# Patient Record
Sex: MALE | Race: WHITE | NOT HISPANIC OR LATINO | Employment: UNEMPLOYED | URBAN - METROPOLITAN AREA
[De-identification: names, ages, dates, MRNs, and addresses within clinical notes are randomized per-mention and may not be internally consistent; named-entity substitution may affect disease eponyms.]

---

## 2019-04-28 ENCOUNTER — HOSPITAL ENCOUNTER (EMERGENCY)
Facility: MEDICAL CENTER | Age: 19
End: 2019-04-28
Payer: COMMERCIAL

## 2019-04-28 VITALS
RESPIRATION RATE: 15 BRPM | OXYGEN SATURATION: 98 % | HEIGHT: 68 IN | SYSTOLIC BLOOD PRESSURE: 103 MMHG | DIASTOLIC BLOOD PRESSURE: 72 MMHG | HEART RATE: 95 BPM | WEIGHT: 136.69 LBS | BODY MASS INDEX: 20.72 KG/M2 | TEMPERATURE: 97.2 F

## 2019-04-28 PROCEDURE — 302449 STATCHG TRIAGE ONLY (STATISTIC)

## 2019-04-29 NOTE — ED TRIAGE NOTES
Chief Complaint   Patient presents with   • Abdominal Pain     x2 days   • Vomiting     Ambulatory to triage for above. VSS. Explained triage process, to waiting room. Asked to inform RN if questions or concerns arise.

## 2019-04-29 NOTE — ED NOTES
Pt called for 2nd time from senior nancy and main lobby. No response. Discharging from system at this time.

## 2019-11-13 ENCOUNTER — HOSPITAL ENCOUNTER (EMERGENCY)
Facility: MEDICAL CENTER | Age: 19
End: 2019-11-13
Attending: EMERGENCY MEDICINE
Payer: MEDICAID

## 2019-11-13 VITALS
WEIGHT: 145.94 LBS | HEART RATE: 84 BPM | OXYGEN SATURATION: 99 % | TEMPERATURE: 97.7 F | RESPIRATION RATE: 18 BRPM | HEIGHT: 67 IN | DIASTOLIC BLOOD PRESSURE: 68 MMHG | BODY MASS INDEX: 22.91 KG/M2 | SYSTOLIC BLOOD PRESSURE: 100 MMHG

## 2019-11-13 DIAGNOSIS — K04.7 DENTAL INFECTION: ICD-10-CM

## 2019-11-13 PROCEDURE — A9270 NON-COVERED ITEM OR SERVICE: HCPCS | Performed by: EMERGENCY MEDICINE

## 2019-11-13 PROCEDURE — 700102 HCHG RX REV CODE 250 W/ 637 OVERRIDE(OP): Performed by: EMERGENCY MEDICINE

## 2019-11-13 PROCEDURE — 99283 EMERGENCY DEPT VISIT LOW MDM: CPT

## 2019-11-13 RX ORDER — AMOXICILLIN 500 MG/1
1000 CAPSULE ORAL ONCE
Status: COMPLETED | OUTPATIENT
Start: 2019-11-13 | End: 2019-11-13

## 2019-11-13 RX ORDER — ACETAMINOPHEN 325 MG/1
650 TABLET ORAL ONCE
Status: COMPLETED | OUTPATIENT
Start: 2019-11-13 | End: 2019-11-13

## 2019-11-13 RX ORDER — IBUPROFEN 200 MG
200 TABLET ORAL EVERY 6 HOURS PRN
Qty: 30 TAB | Refills: 0 | Status: SHIPPED | OUTPATIENT
Start: 2019-11-13 | End: 2019-12-31

## 2019-11-13 RX ORDER — AMOXICILLIN 500 MG/1
500 CAPSULE ORAL 3 TIMES DAILY
Qty: 30 CAP | Refills: 0 | Status: SHIPPED | OUTPATIENT
Start: 2019-11-13 | End: 2019-11-23

## 2019-11-13 RX ADMIN — AMOXICILLIN 1000 MG: 500 CAPSULE ORAL at 09:08

## 2019-11-13 RX ADMIN — ACETAMINOPHEN 650 MG: 325 TABLET, FILM COATED ORAL at 09:08

## 2019-11-13 NOTE — ED PROVIDER NOTES
"ED Provider Note      CHIEF COMPLAINT  Chief Complaint   Patient presents with   • Dental Pain       HPI  Christian Lima Jr. is a 18 y.o. male who presents with dental pain. Pain started a while ago. Pain is located right maxilla. Pain is severe. Constant. It is getting worse. No fevers. No neck stiffness or headache. Swelling is starting to develop.    REVIEW OF SYSTEMS      See HPI    PAST MEDICAL HISTORY  Past Medical History:   Diagnosis Date   • Sciatica        SOCIAL HISTORY  Social History     Tobacco Use   • Smoking status: Current Every Day Smoker     Packs/day: 0.50   • Smokeless tobacco: Never Used   Substance Use Topics   • Alcohol use: No   • Drug use: Yes     Types: Inhaled     Comment: marijuana 2/week       ALLERGIES  No Known Allergies    PHYSICAL EXAM  VITAL SIGNS: /68   Pulse 84   Temp 36.5 °C (97.7 °F) (Oral)   Resp 18   Ht 1.702 m (5' 7\")   Wt 66.2 kg (145 lb 15.1 oz)   SpO2 99%   BMI 22.86 kg/m²   Constitutional: Well developed, Well nourished, No acute distress, Non-toxic appearance.   HENT:  Atraumatic, Normocephalic. Bilateral external ears normal, Oropharynx with widespread dental decay.  There is gingival inflammation and mild swelling externally on the gingiva overlying the right maxillary molar and premolar region.  No abscess.  No facial cellulitis  Eyes: Grossly Normal inspection  Neck: Normal range of motion  Cardiovascular: Normal heart rate  Thorax & Lungs: No respiratory distress  Skin: No rash.     COURSE & MEDICAL DECISION MAKING  Patient with dental caries. No sign of abscess. We'll treat with amoxicillin and ibuprofen. I have advised followup with dentist as soon as possible. Asked to return to the emergency department for swelling, high fevers, or concern.      FINAL IMPRESSION  1. dental caries with secondary infection      This dictation was created using voice recognition software. The accuracy of the dictation is limited to the abilities of the " software.   The nursing notes were reviewed and certain aspects of this information were incorporated into this note.      Electronically signed by: Balaji Arenas, 11/13/2019 8:58 AM

## 2019-11-13 NOTE — ED NOTES
Discharge, FU, and Rx education provided to patient who verbalizes understanding   Patient denies further questions at this time   Pt was given prescriptions and Dental Referral Sheet   Wheelchair was offered to the waiting room and refused by patient  Patient ambulated to the ED Lobby with steady gait

## 2019-11-13 NOTE — ED NOTES
Assist RN  Ambulates to room w/steady gait. C/o pain in right lower tooth x 2 weeks states its d/t abscess. Denies any trauma.  Call light within reach. Instructed to call staff for any assistance.

## 2019-11-27 ENCOUNTER — HOSPITAL ENCOUNTER (EMERGENCY)
Facility: MEDICAL CENTER | Age: 19
End: 2019-11-27
Attending: EMERGENCY MEDICINE
Payer: COMMERCIAL

## 2019-11-27 VITALS
SYSTOLIC BLOOD PRESSURE: 120 MMHG | HEIGHT: 67 IN | RESPIRATION RATE: 16 BRPM | DIASTOLIC BLOOD PRESSURE: 90 MMHG | HEART RATE: 97 BPM | OXYGEN SATURATION: 100 % | BODY MASS INDEX: 23.25 KG/M2 | WEIGHT: 148.15 LBS | TEMPERATURE: 97.8 F

## 2019-11-27 DIAGNOSIS — J10.1 INFLUENZA B: ICD-10-CM

## 2019-11-27 DIAGNOSIS — H81.10 BENIGN PAROXYSMAL POSITIONAL VERTIGO, UNSPECIFIED LATERALITY: ICD-10-CM

## 2019-11-27 LAB
ANION GAP SERPL CALC-SCNC: 9 MMOL/L (ref 0–11.9)
BASOPHILS # BLD AUTO: 0.6 % (ref 0–1.8)
BASOPHILS # BLD: 0.03 K/UL (ref 0–0.12)
BUN SERPL-MCNC: 12 MG/DL (ref 8–22)
CALCIUM SERPL-MCNC: 9 MG/DL (ref 8.5–10.5)
CHLORIDE SERPL-SCNC: 104 MMOL/L (ref 96–112)
CO2 SERPL-SCNC: 26 MMOL/L (ref 20–33)
CREAT SERPL-MCNC: 0.88 MG/DL (ref 0.5–1.4)
EKG IMPRESSION: NORMAL
EOSINOPHIL # BLD AUTO: 0.02 K/UL (ref 0–0.51)
EOSINOPHIL NFR BLD: 0.4 % (ref 0–6.9)
ERYTHROCYTE [DISTWIDTH] IN BLOOD BY AUTOMATED COUNT: 43.1 FL (ref 35.9–50)
FLUAV RNA SPEC QL NAA+PROBE: NEGATIVE
FLUBV RNA SPEC QL NAA+PROBE: POSITIVE
GLUCOSE SERPL-MCNC: 89 MG/DL (ref 65–99)
HCT VFR BLD AUTO: 44.1 % (ref 42–52)
HGB BLD-MCNC: 14.4 G/DL (ref 14–18)
IMM GRANULOCYTES # BLD AUTO: 0.06 K/UL (ref 0–0.11)
IMM GRANULOCYTES NFR BLD AUTO: 1.3 % (ref 0–0.9)
LYMPHOCYTES # BLD AUTO: 0.99 K/UL (ref 1–4.8)
LYMPHOCYTES NFR BLD: 21 % (ref 22–41)
MCH RBC QN AUTO: 30 PG (ref 27–33)
MCHC RBC AUTO-ENTMCNC: 32.7 G/DL (ref 33.7–35.3)
MCV RBC AUTO: 91.9 FL (ref 81.4–97.8)
MONOCYTES # BLD AUTO: 0.79 K/UL (ref 0–0.85)
MONOCYTES NFR BLD AUTO: 16.7 % (ref 0–13.4)
NEUTROPHILS # BLD AUTO: 2.83 K/UL (ref 1.82–7.42)
NEUTROPHILS NFR BLD: 60 % (ref 44–72)
NRBC # BLD AUTO: 0 K/UL
NRBC BLD-RTO: 0 /100 WBC
PLATELET # BLD AUTO: 205 K/UL (ref 164–446)
PMV BLD AUTO: 9.8 FL (ref 9–12.9)
POTASSIUM SERPL-SCNC: 3.7 MMOL/L (ref 3.6–5.5)
RBC # BLD AUTO: 4.8 M/UL (ref 4.7–6.1)
SODIUM SERPL-SCNC: 139 MMOL/L (ref 135–145)
WBC # BLD AUTO: 4.7 K/UL (ref 4.8–10.8)

## 2019-11-27 PROCEDURE — 700111 HCHG RX REV CODE 636 W/ 250 OVERRIDE (IP): Performed by: EMERGENCY MEDICINE

## 2019-11-27 PROCEDURE — 93005 ELECTROCARDIOGRAM TRACING: CPT | Performed by: EMERGENCY MEDICINE

## 2019-11-27 PROCEDURE — 700102 HCHG RX REV CODE 250 W/ 637 OVERRIDE(OP): Performed by: EMERGENCY MEDICINE

## 2019-11-27 PROCEDURE — 87502 INFLUENZA DNA AMP PROBE: CPT

## 2019-11-27 PROCEDURE — 700105 HCHG RX REV CODE 258: Performed by: EMERGENCY MEDICINE

## 2019-11-27 PROCEDURE — A9270 NON-COVERED ITEM OR SERVICE: HCPCS | Performed by: EMERGENCY MEDICINE

## 2019-11-27 PROCEDURE — 85025 COMPLETE CBC W/AUTO DIFF WBC: CPT

## 2019-11-27 PROCEDURE — 80048 BASIC METABOLIC PNL TOTAL CA: CPT

## 2019-11-27 PROCEDURE — 99284 EMERGENCY DEPT VISIT MOD MDM: CPT

## 2019-11-27 PROCEDURE — 96374 THER/PROPH/DIAG INJ IV PUSH: CPT

## 2019-11-27 RX ORDER — MECLIZINE HYDROCHLORIDE 25 MG/1
25 TABLET ORAL EVERY 6 HOURS PRN
Qty: 10 TAB | Refills: 0 | Status: SHIPPED | OUTPATIENT
Start: 2019-11-27 | End: 2019-12-31

## 2019-11-27 RX ORDER — OSELTAMIVIR PHOSPHATE 75 MG/1
75 CAPSULE ORAL 2 TIMES DAILY
Qty: 10 CAP | Refills: 0 | Status: SHIPPED | OUTPATIENT
Start: 2019-11-27 | End: 2019-12-02

## 2019-11-27 RX ORDER — IBUPROFEN 600 MG/1
600 TABLET ORAL ONCE
Status: COMPLETED | OUTPATIENT
Start: 2019-11-27 | End: 2019-11-27

## 2019-11-27 RX ORDER — SODIUM CHLORIDE 9 MG/ML
1000 INJECTION, SOLUTION INTRAVENOUS ONCE
Status: COMPLETED | OUTPATIENT
Start: 2019-11-27 | End: 2019-11-27

## 2019-11-27 RX ORDER — MECLIZINE HYDROCHLORIDE 25 MG/1
25 TABLET ORAL ONCE
Status: COMPLETED | OUTPATIENT
Start: 2019-11-27 | End: 2019-11-27

## 2019-11-27 RX ORDER — IBUPROFEN 600 MG/1
600 TABLET ORAL EVERY 6 HOURS PRN
Qty: 30 TAB | Refills: 0 | Status: SHIPPED | OUTPATIENT
Start: 2019-11-27 | End: 2019-12-28

## 2019-11-27 RX ORDER — ONDANSETRON 2 MG/ML
4 INJECTION INTRAMUSCULAR; INTRAVENOUS ONCE
Status: COMPLETED | OUTPATIENT
Start: 2019-11-27 | End: 2019-11-27

## 2019-11-27 RX ADMIN — ONDANSETRON 4 MG: 2 INJECTION INTRAMUSCULAR; INTRAVENOUS at 14:17

## 2019-11-27 RX ADMIN — MECLIZINE HYDROCHLORIDE 25 MG: 25 TABLET ORAL at 14:17

## 2019-11-27 RX ADMIN — SODIUM CHLORIDE 1000 ML: 9 INJECTION, SOLUTION INTRAVENOUS at 14:17

## 2019-11-27 RX ADMIN — IBUPROFEN 600 MG: 600 TABLET ORAL at 15:53

## 2019-11-27 ASSESSMENT — ENCOUNTER SYMPTOMS
DIZZINESS: 1
EYE REDNESS: 0
NECK PAIN: 0
FEVER: 1
ABDOMINAL PAIN: 0
VOMITING: 0
BACK PAIN: 0
SHORTNESS OF BREATH: 0
NAUSEA: 1
COUGH: 1
FOCAL WEAKNESS: 0
SEIZURES: 0
BLURRED VISION: 0
DIARRHEA: 1
CHILLS: 1
SORE THROAT: 1
MYALGIAS: 1
HEADACHES: 0

## 2019-11-27 ASSESSMENT — LIFESTYLE VARIABLES: DO YOU DRINK ALCOHOL: NO

## 2019-11-27 NOTE — ED TRIAGE NOTES
Chief Complaint   Patient presents with   • Vertigo     Pt states symptoms since this am after waking. reports going to bed feeling ok.    • Body Aches   • Fever   • Chills       Explained to pt triage process, made pt aware to tell this RN/staff of any changes/concerns, pt verbalized understanding of process and instructions given. Pt to ER amanda.

## 2019-11-27 NOTE — ED PROVIDER NOTES
ED Provider Note    CHIEF COMPLAINT  Chief Complaint   Patient presents with   • Vertigo     Pt states symptoms since this am after waking. reports going to bed feeling ok.    • Body Aches   • Fever   • Chills       HPI  Christian Lima Jr. is a 18 y.o. otherwise healthy male who presents with flulike symptoms and dizziness onset this morning.  Patient states he was feeling well prior to going to bed yesterday evening.  He awoke this morning and turned over in bed with acute onset of dizziness described as vertigo.  In addition to this, he endorses subjective fevers and chills, nasal congestion, sore throat, cough, bilateral ear pain.  He has endorse nausea without vomiting and some diarrhea.  He does not know of any sick contacts.  He did not receive a flu vaccination this year.  He has had no recent head trauma.  Denies significant headache.    REVIEW OF SYSTEMS  See HPI for further details.   Review of Systems   Constitutional: Positive for chills and fever.   HENT: Positive for congestion, ear pain and sore throat.    Eyes: Negative for blurred vision and redness.   Respiratory: Positive for cough. Negative for shortness of breath.    Cardiovascular: Negative for chest pain and leg swelling.   Gastrointestinal: Positive for diarrhea and nausea. Negative for abdominal pain and vomiting.   Genitourinary: Negative for dysuria and urgency.   Musculoskeletal: Positive for myalgias. Negative for back pain and neck pain.   Skin: Negative for rash.   Neurological: Positive for dizziness. Negative for focal weakness, seizures and headaches.   Psychiatric/Behavioral: Negative for suicidal ideas.         PAST MEDICAL HISTORY   has a past medical history of Sciatica.    SOCIAL HISTORY  Social History     Tobacco Use   • Smoking status: Current Every Day Smoker     Packs/day: 0.50   • Smokeless tobacco: Never Used   Substance and Sexual Activity   • Alcohol use: No   • Drug use: Yes     Types: Inhaled     Comment:  "marijuana 2/week   • Sexual activity: Not on file       SURGICAL HISTORY  patient denies any surgical history    CURRENT MEDICATIONS  Home Medications    **Home medications have not yet been reviewed for this encounter**         ALLERGIES  No Known Allergies    PHYSICAL EXAM   VITAL SIGNS: /90   Pulse 97   Temp 36.6 °C (97.8 °F) (Temporal)   Resp 16   Ht 1.702 m (5' 7\")   Wt 67.2 kg (148 lb 2.4 oz)   SpO2 100%   BMI 23.20 kg/m²      Physical Exam   Constitutional: He is oriented to person, place, and time and well-developed, well-nourished, and in no distress. No distress.   Nontoxic-appearing young male.   HENT:   Head: Normocephalic and atraumatic.   Mouth/Throat: No oropharyngeal exudate.   Mild posterior oropharynx erythema.  No significant tonsillar swelling or exudates.  Bilateral TM's are normal.   Eyes: Pupils are equal, round, and reactive to light. Conjunctivae are normal.   Neck: Normal range of motion. Neck supple.   Cardiovascular: Normal rate, regular rhythm and normal heart sounds.   Pulmonary/Chest: Effort normal and breath sounds normal. No respiratory distress.   Abdominal: Soft. He exhibits no distension. There is no tenderness.   Musculoskeletal: Normal range of motion.         General: No tenderness or edema.   Neurological: He is alert and oriented to person, place, and time.   Alert and oriented x3.  Cranial nerves II-XII intact.  Strength 5/5 and sensation inact throughout all 4 extremities.  No pronator drift.  No dysmetria.  Normal speech. Normal gait.   Skin: Skin is warm and dry.   Psychiatric: Affect normal.         DIAGNOSTIC STUDIES    EKG  Results for orders placed or performed during the hospital encounter of 11/27/19   EKG (Now)   Result Value Ref Range    Report       [facility] Emergency Dept[facility]    Test Date:  2019-11-27  Pt Name:    JONNA PEREA             Department: ER  MRN:        [id number]                      Room:        57  Gender:     " "Male                         Technician: [id number]  :        2000                   Requested By:ALBA ALCALA  Order #:    [id number]                    Reading MD: Alba Alcala MD    Measurements  Intervals                                Axis  Rate:       96                           P:          65  NY:         140                          QRS:        58  QRSD:       88                           T:          19  QT:         312  QTc:        395    Interpretive Statements  SINUS RHYTHM  Normal axis and intervals  No ectopy or hypertrophy  No ST or T wave change  No previous ECG available for comparison  Electronically Signed On 2019 15:32:59 PST by Alba Alcala MD             LABS  Personally reviewed by me  Labs Reviewed   CBC WITH DIFFERENTIAL - Abnormal; Notable for the following components:       Result Value    WBC 4.7 (*)     MCHC 32.7 (*)     Lymphocytes 21.00 (*)     Monocytes 16.70 (*)     Immature Granulocytes 1.30 (*)     Lymphs (Absolute) 0.99 (*)     All other components within normal limits   INFLUENZA A/B BY PCR - Abnormal; Notable for the following components:    Influenza virus B, PCR POSITIVE (*)     All other components within normal limits   BASIC METABOLIC PANEL   ESTIMATED GFR           RADIOLOGY  Personally reviewed by me  No orders to display       ED COURSE  Vitals:    19 1233 19 1506   BP: 120/96 120/90   Pulse: (!) 109 97   Resp: 18 16   Temp: 36.6 °C (97.9 °F) 36.6 °C (97.8 °F)   TempSrc: Temporal Temporal   SpO2: 98% 100%   Weight: 67.2 kg (148 lb 2.4 oz)    Height: 1.702 m (5' 7\")          Medications administered:  Medications   NS infusion 1,000 mL (0 mL Intravenous Stopped 19 1520)   ondansetron (ZOFRAN) syringe/vial injection 4 mg (4 mg Intravenous Given 19 1417)   meclizine (ANTIVERT) tablet 25 mg (25 mg Oral Given 19 1417)   ibuprofen (MOTRIN) tablet 600 mg (600 mg Oral Given 19 1553)     Patient was given IV fluids for " tachycardia and possible.  IV hydration was used because oral hydration was not adequate alone.  Following fluid administration patient's symptoms and vitals were improved.      MEDICAL DECISION MAKING  Young otherwise healthy patient presents with flu like symptoms and vertigo onset this morning.  He is nontoxic appearing with mild tachycardia however otherwise normal vitals.  No focal neurologic deficits or concern for central process to explain vertigo such as intracranial mass or posterior stroke.  History consistent with BPPV.  History and exam not concerning for meningitis, strep pharyngitis, acute otitis media, pneumonia. Labs are reassuring without electrolyte abnormality or signs of sepsis.  Influenza test positive. Plan to treat empirically followed by reassessment.    Upon reassessment, patient is resting comfortably with normal vital signs.  No new complaints at this time.  Discussed results with patient and/or family as well as importance of primary care follow up.  Understands risks and benefits of Tamiflu and still requesting prescription. Patient understands plan of care and strict return precautions for new or changing symptoms.       IMPRESSION  (J10.1) Influenza B  (H81.10) Benign paroxysmal positional vertigo, unspecified laterality    Disposition: Discharge home, stable condition  Results, diagnoses, and treatment options were discussed with the patient and/or family. Patient verbalized understanding of plan of care.    Patient referred to primary care provider for monitoring and treatment of blood pressure.      Discharge Medication List as of 11/27/2019  3:49 PM      START taking these medications    Details   oseltamivir (TAMIFLU) 75 MG Cap Take 1 Cap by mouth 2 times a day for 5 days., Disp-10 Cap, R-0, Print Rx Paper      meclizine (ANTIVERT) 25 MG Tab Take 1 Tab by mouth every 6 hours as needed for Dizziness., Disp-10 Tab, R-0, Print Rx Paper      !! ibuprofen (MOTRIN) 600 MG Tab Take 1  Tab by mouth every 6 hours as needed., Disp-30 Tab, R-0, Print Rx Paper       !! - Potential duplicate medications found. Please discuss with provider.              Electronically signed by: Alba Rodriguez, 11/27/2019 1:30 PM

## 2019-12-28 ENCOUNTER — HOSPITAL ENCOUNTER (EMERGENCY)
Facility: MEDICAL CENTER | Age: 19
End: 2019-12-28
Attending: EMERGENCY MEDICINE
Payer: COMMERCIAL

## 2019-12-28 VITALS
SYSTOLIC BLOOD PRESSURE: 111 MMHG | HEART RATE: 91 BPM | DIASTOLIC BLOOD PRESSURE: 63 MMHG | TEMPERATURE: 98.1 F | RESPIRATION RATE: 16 BRPM | HEIGHT: 67 IN | OXYGEN SATURATION: 98 % | BODY MASS INDEX: 24.15 KG/M2 | WEIGHT: 153.88 LBS

## 2019-12-28 DIAGNOSIS — K04.7 DENTAL INFECTION: ICD-10-CM

## 2019-12-28 PROCEDURE — 700111 HCHG RX REV CODE 636 W/ 250 OVERRIDE (IP): Performed by: EMERGENCY MEDICINE

## 2019-12-28 PROCEDURE — 96375 TX/PRO/DX INJ NEW DRUG ADDON: CPT

## 2019-12-28 PROCEDURE — 700105 HCHG RX REV CODE 258: Performed by: EMERGENCY MEDICINE

## 2019-12-28 PROCEDURE — 99284 EMERGENCY DEPT VISIT MOD MDM: CPT

## 2019-12-28 PROCEDURE — 96365 THER/PROPH/DIAG IV INF INIT: CPT

## 2019-12-28 RX ORDER — KETOROLAC TROMETHAMINE 30 MG/ML
30 INJECTION, SOLUTION INTRAMUSCULAR; INTRAVENOUS ONCE
Status: COMPLETED | OUTPATIENT
Start: 2019-12-28 | End: 2019-12-28

## 2019-12-28 RX ORDER — IBUPROFEN 600 MG/1
600 TABLET ORAL EVERY 6 HOURS PRN
Qty: 30 TAB | Refills: 0 | Status: SHIPPED | OUTPATIENT
Start: 2019-12-28 | End: 2019-12-31

## 2019-12-28 RX ORDER — DEXAMETHASONE SODIUM PHOSPHATE 10 MG/ML
10 INJECTION, SOLUTION INTRAMUSCULAR; INTRAVENOUS ONCE
Status: COMPLETED | OUTPATIENT
Start: 2019-12-28 | End: 2019-12-28

## 2019-12-28 RX ORDER — AMOXICILLIN AND CLAVULANATE POTASSIUM 875; 125 MG/1; MG/1
1 TABLET, FILM COATED ORAL 2 TIMES DAILY
Qty: 20 TAB | Refills: 0 | Status: ON HOLD | OUTPATIENT
Start: 2019-12-28 | End: 2020-01-01 | Stop reason: SDUPTHER

## 2019-12-28 RX ADMIN — DEXAMETHASONE SODIUM PHOSPHATE 10 MG: 10 INJECTION INTRAMUSCULAR; INTRAVENOUS at 10:31

## 2019-12-28 RX ADMIN — KETOROLAC TROMETHAMINE 30 MG: 30 INJECTION, SOLUTION INTRAMUSCULAR at 10:31

## 2019-12-28 RX ADMIN — AMPICILLIN SODIUM AND SULBACTAM SODIUM 3 G: 2; 1 INJECTION, POWDER, FOR SOLUTION INTRAMUSCULAR; INTRAVENOUS at 10:31
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
## 2019-12-28 NOTE — ED PROVIDER NOTES
ED Provider Note    CHIEF COMPLAINT  Chief Complaint   Patient presents with   • Oral Swelling       HPI  Christian Lima Jr. is a 19 y.o. male who presents the chief complaint of oral swelling and pain started for 1 day on the right upper side.  Worse when he puts pressure on it.  No alleviating factors did not take any pain medicine.  No recent antibiotics for the last month.  No difficulty swelling.  It is constant.    Patient history of bad dentition and some poor dentition in the right upper molar area.  He was seen here a month ago for dental infection he states that usually he will need antibiotics about once a year last antibiotics were 1 month ago.    REVIEW OF SYSTEMS  General: No fever or chills.    Ear nose throat: The above no trouble swallowing  Pulmonary: No shortness of breath or cough.      PAST MEDICAL HISTORY  Past Medical History:   Diagnosis Date   • Sciatica        FAMILY HISTORY  History reviewed. No pertinent family history.    SOCIAL HISTORY  Social History     Socioeconomic History   • Marital status: Single     Spouse name: Not on file   • Number of children: Not on file   • Years of education: Not on file   • Highest education level: Not on file   Occupational History   • Not on file   Social Needs   • Financial resource strain: Not on file   • Food insecurity:     Worry: Not on file     Inability: Not on file   • Transportation needs:     Medical: Not on file     Non-medical: Not on file   Tobacco Use   • Smoking status: Current Every Day Smoker     Packs/day: 0.50   • Smokeless tobacco: Never Used   Substance and Sexual Activity   • Alcohol use: No   • Drug use: Yes     Types: Inhaled     Comment: marijuana 2/week   • Sexual activity: Not on file   Lifestyle   • Physical activity:     Days per week: Not on file     Minutes per session: Not on file   • Stress: Not on file   Relationships   • Social connections:     Talks on phone: Not on file     Gets together: Not on file      "Attends [employer] service: Not on file     Active member of club or organization: Not on file     Attends meetings of clubs or organizations: Not on file     Relationship status: Not on file   • Intimate partner violence:     Fear of current or ex partner: Not on file     Emotionally abused: Not on file     Physically abused: Not on file     Forced sexual activity: Not on file   Other Topics Concern   • Not on file   Social History Narrative   • Not on file       SURGICAL HISTORY  History reviewed. No pertinent surgical history.    CURRENT MEDICATIONS  Home Medications    **Home medications have not yet been reviewed for this encounter**          ALLERGIES  No Known Allergies    PHYSICAL EXAM  VITAL SIGNS: /66   Pulse 96   Temp 36.7 °C (98.1 °F) (Temporal)   Resp 16   Ht 1.702 m (5' 7\")   Wt 69.8 kg (153 lb 14.1 oz)   SpO2 100%   BMI 24.10 kg/m²   Constitutional: Well developed, Well nourished, No acute distress, Non-toxic appearance.   HENT: Is swelling on the right upper buccal mucosa.  There is no fluctuance no mass that is tender.  She is a minimal gingival tenderness as well there is necrotic teeth in the right upper molar and premolar area.  They are fractured.  Musculoskeletal: Neck has normal range of motion, No tenderness, Supple.   Lymphatic: No cervical lymphadenopathy noted.   Thorax & Lungs: Respiratory distress no muffled voice  Psychiatric: Calm, not anxious  Neurologic: Alert & oriented, moves all extremities equally    RADIOLOGY/PROCEDURES  He was established patient received Unasyn and Decadron Toradol    COURSE & MEDICAL DECISION MAKING  Pertinent Labs & Imaging studies reviewed. (See chart for details)  Early dental infection with some facial cellulitis about dose 1 dose as Unasyn with some Decadron will give some Toradol.  The patient's airway is intact he is talking full sentence looks well    Is a nontoxic patient reevaluation at 11 AM.  Overhead give the patient Augmentin to take " as directed for good penetration we recommend he take medication as directed for the next 7 days also recommend seeing dental will try to give a dental referral.  Patient is facial swelling no airway issue healthy no fever not tachycardic no indication for further testing at this time as he should respond to therapy however, he was warned there is always a possibility for not responding therapy and he needs return to the ER if not improved or if symptoms worsen.    FINAL IMPRESSION  1.  Dental caries  2.  Suspected facial cellulitis  3.      Electronically signed by: Meng Diaz, 12/28/2019 10:13 AM

## 2019-12-31 ENCOUNTER — HOSPITAL ENCOUNTER (INPATIENT)
Facility: MEDICAL CENTER | Age: 19
LOS: 1 days | DRG: 137 | End: 2020-01-01
Attending: EMERGENCY MEDICINE | Admitting: HOSPITALIST
Payer: COMMERCIAL

## 2019-12-31 ENCOUNTER — ANESTHESIA EVENT (OUTPATIENT)
Dept: SURGERY | Facility: MEDICAL CENTER | Age: 19
DRG: 137 | End: 2019-12-31
Payer: COMMERCIAL

## 2019-12-31 ENCOUNTER — APPOINTMENT (OUTPATIENT)
Dept: RADIOLOGY | Facility: MEDICAL CENTER | Age: 19
DRG: 137 | End: 2019-12-31
Attending: EMERGENCY MEDICINE
Payer: COMMERCIAL

## 2019-12-31 ENCOUNTER — ANESTHESIA (OUTPATIENT)
Dept: SURGERY | Facility: MEDICAL CENTER | Age: 19
DRG: 137 | End: 2019-12-31
Payer: COMMERCIAL

## 2019-12-31 DIAGNOSIS — K04.7 DENTAL ABSCESS: ICD-10-CM

## 2019-12-31 DIAGNOSIS — L03.211 FACIAL CELLULITIS: ICD-10-CM

## 2019-12-31 PROBLEM — Z72.0 TOBACCO USE: Status: ACTIVE | Noted: 2019-12-31

## 2019-12-31 LAB
ALBUMIN SERPL BCP-MCNC: 4.2 G/DL (ref 3.2–4.9)
ALBUMIN/GLOB SERPL: 1.4 G/DL
ALP SERPL-CCNC: 65 U/L (ref 30–99)
ALT SERPL-CCNC: 8 U/L (ref 2–50)
ANION GAP SERPL CALC-SCNC: 7 MMOL/L (ref 0–11.9)
AST SERPL-CCNC: 10 U/L (ref 12–45)
BASOPHILS # BLD AUTO: 0.5 % (ref 0–1.8)
BASOPHILS # BLD: 0.06 K/UL (ref 0–0.12)
BILIRUB SERPL-MCNC: 0.3 MG/DL (ref 0.1–1.5)
BUN SERPL-MCNC: 14 MG/DL (ref 8–22)
CALCIUM SERPL-MCNC: 9.1 MG/DL (ref 8.5–10.5)
CHLORIDE SERPL-SCNC: 110 MMOL/L (ref 96–112)
CO2 SERPL-SCNC: 24 MMOL/L (ref 20–33)
CREAT SERPL-MCNC: 0.92 MG/DL (ref 0.5–1.4)
EOSINOPHIL # BLD AUTO: 0.13 K/UL (ref 0–0.51)
EOSINOPHIL NFR BLD: 1 % (ref 0–6.9)
ERYTHROCYTE [DISTWIDTH] IN BLOOD BY AUTOMATED COUNT: 42.5 FL (ref 35.9–50)
GLOBULIN SER CALC-MCNC: 3.1 G/DL (ref 1.9–3.5)
GLUCOSE SERPL-MCNC: 76 MG/DL (ref 65–99)
HCT VFR BLD AUTO: 41.9 % (ref 42–52)
HGB BLD-MCNC: 13.8 G/DL (ref 14–18)
IMM GRANULOCYTES # BLD AUTO: 0.07 K/UL (ref 0–0.11)
IMM GRANULOCYTES NFR BLD AUTO: 0.6 % (ref 0–0.9)
LACTATE BLD-SCNC: 1.2 MMOL/L (ref 0.5–2)
LYMPHOCYTES # BLD AUTO: 2.33 K/UL (ref 1–4.8)
LYMPHOCYTES NFR BLD: 18.7 % (ref 22–41)
MCH RBC QN AUTO: 30.4 PG (ref 27–33)
MCHC RBC AUTO-ENTMCNC: 32.9 G/DL (ref 33.7–35.3)
MCV RBC AUTO: 92.3 FL (ref 81.4–97.8)
MONOCYTES # BLD AUTO: 1.53 K/UL (ref 0–0.85)
MONOCYTES NFR BLD AUTO: 12.2 % (ref 0–13.4)
NEUTROPHILS # BLD AUTO: 8.37 K/UL (ref 1.82–7.42)
NEUTROPHILS NFR BLD: 67 % (ref 44–72)
NRBC # BLD AUTO: 0 K/UL
NRBC BLD-RTO: 0 /100 WBC
PLATELET # BLD AUTO: 254 K/UL (ref 164–446)
PMV BLD AUTO: 10.1 FL (ref 9–12.9)
POTASSIUM SERPL-SCNC: 4.1 MMOL/L (ref 3.6–5.5)
PROT SERPL-MCNC: 7.3 G/DL (ref 6–8.2)
RBC # BLD AUTO: 4.54 M/UL (ref 4.7–6.1)
SODIUM SERPL-SCNC: 141 MMOL/L (ref 135–145)
WBC # BLD AUTO: 12.5 K/UL (ref 4.8–10.8)

## 2019-12-31 PROCEDURE — 160002 HCHG RECOVERY MINUTES (STAT): Performed by: DENTIST

## 2019-12-31 PROCEDURE — 700101 HCHG RX REV CODE 250: Performed by: DENTIST

## 2019-12-31 PROCEDURE — 160048 HCHG OR STATISTICAL LEVEL 1-5: Performed by: DENTIST

## 2019-12-31 PROCEDURE — 700117 HCHG RX CONTRAST REV CODE 255: Performed by: EMERGENCY MEDICINE

## 2019-12-31 PROCEDURE — 80053 COMPREHEN METABOLIC PANEL: CPT

## 2019-12-31 PROCEDURE — 700111 HCHG RX REV CODE 636 W/ 250 OVERRIDE (IP): Performed by: ANESTHESIOLOGY

## 2019-12-31 PROCEDURE — 700111 HCHG RX REV CODE 636 W/ 250 OVERRIDE (IP): Performed by: HOSPITALIST

## 2019-12-31 PROCEDURE — 700101 HCHG RX REV CODE 250: Performed by: ANESTHESIOLOGY

## 2019-12-31 PROCEDURE — 500440 HCHG DRESSING, STERILE ROLL (KERLIX): Performed by: DENTIST

## 2019-12-31 PROCEDURE — 700111 HCHG RX REV CODE 636 W/ 250 OVERRIDE (IP): Performed by: EMERGENCY MEDICINE

## 2019-12-31 PROCEDURE — 99406 BEHAV CHNG SMOKING 3-10 MIN: CPT | Performed by: HOSPITALIST

## 2019-12-31 PROCEDURE — 87102 FUNGUS ISOLATION CULTURE: CPT

## 2019-12-31 PROCEDURE — 160009 HCHG ANES TIME/MIN: Performed by: DENTIST

## 2019-12-31 PROCEDURE — 0CTW0Z1 RESECTION OF UPPER TOOTH, MULTIPLE, OPEN APPROACH: ICD-10-PCS | Performed by: DENTIST

## 2019-12-31 PROCEDURE — 96365 THER/PROPH/DIAG IV INF INIT: CPT

## 2019-12-31 PROCEDURE — 160027 HCHG SURGERY MINUTES - 1ST 30 MINS LEVEL 2: Performed by: DENTIST

## 2019-12-31 PROCEDURE — 501838 HCHG SUTURE GENERAL: Performed by: DENTIST

## 2019-12-31 PROCEDURE — 500754 HCHG JAW BRA: Performed by: DENTIST

## 2019-12-31 PROCEDURE — 700105 HCHG RX REV CODE 258: Performed by: HOSPITALIST

## 2019-12-31 PROCEDURE — 87015 SPECIMEN INFECT AGNT CONCNTJ: CPT

## 2019-12-31 PROCEDURE — 87116 MYCOBACTERIA CULTURE: CPT

## 2019-12-31 PROCEDURE — 87205 SMEAR GRAM STAIN: CPT

## 2019-12-31 PROCEDURE — 83605 ASSAY OF LACTIC ACID: CPT

## 2019-12-31 PROCEDURE — 99285 EMERGENCY DEPT VISIT HI MDM: CPT

## 2019-12-31 PROCEDURE — 87040 BLOOD CULTURE FOR BACTERIA: CPT

## 2019-12-31 PROCEDURE — 0C940ZZ DRAINAGE OF BUCCAL MUCOSA, OPEN APPROACH: ICD-10-PCS | Performed by: DENTIST

## 2019-12-31 PROCEDURE — 85025 COMPLETE CBC W/AUTO DIFF WBC: CPT

## 2019-12-31 PROCEDURE — 700111 HCHG RX REV CODE 636 W/ 250 OVERRIDE (IP)

## 2019-12-31 PROCEDURE — 87075 CULTR BACTERIA EXCEPT BLOOD: CPT

## 2019-12-31 PROCEDURE — 700105 HCHG RX REV CODE 258: Performed by: EMERGENCY MEDICINE

## 2019-12-31 PROCEDURE — 70487 CT MAXILLOFACIAL W/DYE: CPT

## 2019-12-31 PROCEDURE — 96375 TX/PRO/DX INJ NEW DRUG ADDON: CPT

## 2019-12-31 PROCEDURE — A9270 NON-COVERED ITEM OR SERVICE: HCPCS | Performed by: HOSPITALIST

## 2019-12-31 PROCEDURE — 700102 HCHG RX REV CODE 250 W/ 637 OVERRIDE(OP): Performed by: HOSPITALIST

## 2019-12-31 PROCEDURE — 160035 HCHG PACU - 1ST 60 MINS PHASE I: Performed by: DENTIST

## 2019-12-31 PROCEDURE — 87206 SMEAR FLUORESCENT/ACID STAI: CPT

## 2019-12-31 PROCEDURE — 770006 HCHG ROOM/CARE - MED/SURG/GYN SEMI*

## 2019-12-31 PROCEDURE — 70355 PANORAMIC X-RAY OF JAWS: CPT

## 2019-12-31 PROCEDURE — 87070 CULTURE OTHR SPECIMN AEROBIC: CPT

## 2019-12-31 PROCEDURE — 99223 1ST HOSP IP/OBS HIGH 75: CPT | Mod: 25 | Performed by: HOSPITALIST

## 2019-12-31 RX ORDER — MORPHINE SULFATE 4 MG/ML
4 INJECTION, SOLUTION INTRAMUSCULAR; INTRAVENOUS ONCE
Status: COMPLETED | OUTPATIENT
Start: 2019-12-31 | End: 2019-12-31

## 2019-12-31 RX ORDER — SODIUM CHLORIDE, SODIUM LACTATE, POTASSIUM CHLORIDE, CALCIUM CHLORIDE 600; 310; 30; 20 MG/100ML; MG/100ML; MG/100ML; MG/100ML
INJECTION, SOLUTION INTRAVENOUS CONTINUOUS
Status: DISCONTINUED | OUTPATIENT
Start: 2019-12-31 | End: 2020-01-01 | Stop reason: HOSPADM

## 2019-12-31 RX ORDER — LIDOCAINE HYDROCHLORIDE 40 MG/ML
SOLUTION TOPICAL PRN
Status: DISCONTINUED | OUTPATIENT
Start: 2019-12-31 | End: 2019-12-31 | Stop reason: SURG

## 2019-12-31 RX ORDER — AMOXICILLIN 250 MG
2 CAPSULE ORAL 2 TIMES DAILY
Status: DISCONTINUED | OUTPATIENT
Start: 2019-12-31 | End: 2020-01-01 | Stop reason: HOSPADM

## 2019-12-31 RX ORDER — ACETAMINOPHEN 325 MG/1
650 TABLET ORAL EVERY 6 HOURS PRN
Status: DISCONTINUED | OUTPATIENT
Start: 2019-12-31 | End: 2020-01-01 | Stop reason: HOSPADM

## 2019-12-31 RX ORDER — MEPERIDINE HYDROCHLORIDE 25 MG/ML
12.5 INJECTION INTRAMUSCULAR; INTRAVENOUS; SUBCUTANEOUS
Status: DISCONTINUED | OUTPATIENT
Start: 2019-12-31 | End: 2019-12-31 | Stop reason: HOSPADM

## 2019-12-31 RX ORDER — DEXAMETHASONE SODIUM PHOSPHATE 4 MG/ML
INJECTION, SOLUTION INTRA-ARTICULAR; INTRALESIONAL; INTRAMUSCULAR; INTRAVENOUS; SOFT TISSUE PRN
Status: DISCONTINUED | OUTPATIENT
Start: 2019-12-31 | End: 2019-12-31 | Stop reason: SURG

## 2019-12-31 RX ORDER — PROMETHAZINE HYDROCHLORIDE 25 MG/1
12.5-25 SUPPOSITORY RECTAL EVERY 4 HOURS PRN
Status: DISCONTINUED | OUTPATIENT
Start: 2019-12-31 | End: 2020-01-01 | Stop reason: HOSPADM

## 2019-12-31 RX ORDER — HALOPERIDOL 5 MG/ML
1 INJECTION INTRAMUSCULAR
Status: DISCONTINUED | OUTPATIENT
Start: 2019-12-31 | End: 2019-12-31 | Stop reason: HOSPADM

## 2019-12-31 RX ORDER — MIDAZOLAM HYDROCHLORIDE 1 MG/ML
1 INJECTION INTRAMUSCULAR; INTRAVENOUS
Status: DISCONTINUED | OUTPATIENT
Start: 2019-12-31 | End: 2019-12-31 | Stop reason: HOSPADM

## 2019-12-31 RX ORDER — BISACODYL 10 MG
10 SUPPOSITORY, RECTAL RECTAL
Status: DISCONTINUED | OUTPATIENT
Start: 2019-12-31 | End: 2020-01-01 | Stop reason: HOSPADM

## 2019-12-31 RX ORDER — ONDANSETRON 2 MG/ML
INJECTION INTRAMUSCULAR; INTRAVENOUS PRN
Status: DISCONTINUED | OUTPATIENT
Start: 2019-12-31 | End: 2019-12-31 | Stop reason: SURG

## 2019-12-31 RX ORDER — LIDOCAINE HYDROCHLORIDE 20 MG/ML
INJECTION, SOLUTION EPIDURAL; INFILTRATION; INTRACAUDAL; PERINEURAL PRN
Status: DISCONTINUED | OUTPATIENT
Start: 2019-12-31 | End: 2019-12-31 | Stop reason: SURG

## 2019-12-31 RX ORDER — PROMETHAZINE HYDROCHLORIDE 25 MG/1
12.5-25 TABLET ORAL EVERY 4 HOURS PRN
Status: DISCONTINUED | OUTPATIENT
Start: 2019-12-31 | End: 2020-01-01 | Stop reason: HOSPADM

## 2019-12-31 RX ORDER — NICOTINE 21 MG/24HR
21 PATCH, TRANSDERMAL 24 HOURS TRANSDERMAL
Status: DISCONTINUED | OUTPATIENT
Start: 2019-12-31 | End: 2020-01-01 | Stop reason: HOSPADM

## 2019-12-31 RX ORDER — MORPHINE SULFATE 4 MG/ML
1-4 INJECTION, SOLUTION INTRAMUSCULAR; INTRAVENOUS
Status: DISCONTINUED | OUTPATIENT
Start: 2019-12-31 | End: 2020-01-01 | Stop reason: HOSPADM

## 2019-12-31 RX ORDER — HYDROMORPHONE HYDROCHLORIDE 1 MG/ML
0.2 INJECTION, SOLUTION INTRAMUSCULAR; INTRAVENOUS; SUBCUTANEOUS
Status: DISCONTINUED | OUTPATIENT
Start: 2019-12-31 | End: 2019-12-31 | Stop reason: HOSPADM

## 2019-12-31 RX ORDER — HYDROMORPHONE HYDROCHLORIDE 1 MG/ML
0.4 INJECTION, SOLUTION INTRAMUSCULAR; INTRAVENOUS; SUBCUTANEOUS
Status: DISCONTINUED | OUTPATIENT
Start: 2019-12-31 | End: 2019-12-31 | Stop reason: HOSPADM

## 2019-12-31 RX ORDER — DIPHENHYDRAMINE HYDROCHLORIDE 50 MG/ML
12.5 INJECTION INTRAMUSCULAR; INTRAVENOUS
Status: DISCONTINUED | OUTPATIENT
Start: 2019-12-31 | End: 2019-12-31 | Stop reason: HOSPADM

## 2019-12-31 RX ORDER — OXYCODONE HYDROCHLORIDE 5 MG/1
5-10 TABLET ORAL
Status: DISCONTINUED | OUTPATIENT
Start: 2019-12-31 | End: 2020-01-01 | Stop reason: HOSPADM

## 2019-12-31 RX ORDER — OXYCODONE HCL 5 MG/5 ML
10 SOLUTION, ORAL ORAL
Status: DISCONTINUED | OUTPATIENT
Start: 2019-12-31 | End: 2019-12-31 | Stop reason: HOSPADM

## 2019-12-31 RX ORDER — ONDANSETRON 2 MG/ML
4 INJECTION INTRAMUSCULAR; INTRAVENOUS
Status: DISCONTINUED | OUTPATIENT
Start: 2019-12-31 | End: 2019-12-31 | Stop reason: HOSPADM

## 2019-12-31 RX ORDER — SODIUM CHLORIDE 9 MG/ML
INJECTION, SOLUTION INTRAVENOUS
Status: ACTIVE
Start: 2019-12-31 | End: 2020-01-01

## 2019-12-31 RX ORDER — PROCHLORPERAZINE EDISYLATE 5 MG/ML
5-10 INJECTION INTRAMUSCULAR; INTRAVENOUS EVERY 4 HOURS PRN
Status: DISCONTINUED | OUTPATIENT
Start: 2019-12-31 | End: 2020-01-01 | Stop reason: HOSPADM

## 2019-12-31 RX ORDER — POLYETHYLENE GLYCOL 3350 17 G/17G
1 POWDER, FOR SOLUTION ORAL
Status: DISCONTINUED | OUTPATIENT
Start: 2019-12-31 | End: 2020-01-01 | Stop reason: HOSPADM

## 2019-12-31 RX ORDER — HYDROMORPHONE HYDROCHLORIDE 1 MG/ML
1 INJECTION, SOLUTION INTRAMUSCULAR; INTRAVENOUS; SUBCUTANEOUS
Status: DISCONTINUED | OUTPATIENT
Start: 2019-12-31 | End: 2019-12-31 | Stop reason: HOSPADM

## 2019-12-31 RX ORDER — ONDANSETRON 4 MG/1
4 TABLET, ORALLY DISINTEGRATING ORAL EVERY 4 HOURS PRN
Status: DISCONTINUED | OUTPATIENT
Start: 2019-12-31 | End: 2020-01-01 | Stop reason: HOSPADM

## 2019-12-31 RX ORDER — CEFAZOLIN SODIUM 1 G/3ML
INJECTION, POWDER, FOR SOLUTION INTRAMUSCULAR; INTRAVENOUS PRN
Status: DISCONTINUED | OUTPATIENT
Start: 2019-12-31 | End: 2019-12-31 | Stop reason: SURG

## 2019-12-31 RX ORDER — IBUPROFEN 600 MG/1
600 TABLET ORAL EVERY 6 HOURS PRN
COMMUNITY

## 2019-12-31 RX ORDER — IPRATROPIUM BROMIDE AND ALBUTEROL SULFATE 2.5; .5 MG/3ML; MG/3ML
3 SOLUTION RESPIRATORY (INHALATION)
Status: DISCONTINUED | OUTPATIENT
Start: 2019-12-31 | End: 2019-12-31 | Stop reason: HOSPADM

## 2019-12-31 RX ORDER — SODIUM CHLORIDE, SODIUM GLUCONATE, SODIUM ACETATE, POTASSIUM CHLORIDE AND MAGNESIUM CHLORIDE 526; 502; 368; 37; 30 MG/100ML; MG/100ML; MG/100ML; MG/100ML; MG/100ML
500 INJECTION, SOLUTION INTRAVENOUS CONTINUOUS
Status: DISCONTINUED | OUTPATIENT
Start: 2019-12-31 | End: 2019-12-31 | Stop reason: HOSPADM

## 2019-12-31 RX ORDER — OXYCODONE HCL 5 MG/5 ML
5 SOLUTION, ORAL ORAL
Status: DISCONTINUED | OUTPATIENT
Start: 2019-12-31 | End: 2019-12-31 | Stop reason: HOSPADM

## 2019-12-31 RX ORDER — MIDAZOLAM HYDROCHLORIDE 1 MG/ML
INJECTION INTRAMUSCULAR; INTRAVENOUS PRN
Status: DISCONTINUED | OUTPATIENT
Start: 2019-12-31 | End: 2019-12-31 | Stop reason: SURG

## 2019-12-31 RX ORDER — KETOROLAC TROMETHAMINE 30 MG/ML
INJECTION, SOLUTION INTRAMUSCULAR; INTRAVENOUS PRN
Status: DISCONTINUED | OUTPATIENT
Start: 2019-12-31 | End: 2019-12-31 | Stop reason: SURG

## 2019-12-31 RX ORDER — ONDANSETRON 2 MG/ML
4 INJECTION INTRAMUSCULAR; INTRAVENOUS EVERY 4 HOURS PRN
Status: DISCONTINUED | OUTPATIENT
Start: 2019-12-31 | End: 2020-01-01 | Stop reason: HOSPADM

## 2019-12-31 RX ADMIN — MORPHINE SULFATE 4 MG: 4 INJECTION INTRAVENOUS at 14:55

## 2019-12-31 RX ADMIN — LIDOCAINE HYDROCHLORIDE 4 ML: 40 SOLUTION TOPICAL at 21:10

## 2019-12-31 RX ADMIN — AMPICILLIN SODIUM AND SULBACTAM SODIUM 3 G: 2; 1 INJECTION, POWDER, FOR SOLUTION INTRAMUSCULAR; INTRAVENOUS at 17:20

## 2019-12-31 RX ADMIN — ONDANSETRON 4 MG: 2 INJECTION INTRAMUSCULAR; INTRAVENOUS at 21:20

## 2019-12-31 RX ADMIN — OXYCODONE HYDROCHLORIDE 10 MG: 5 TABLET ORAL at 15:37

## 2019-12-31 RX ADMIN — FENTANYL CITRATE 50 MCG: 0.05 INJECTION, SOLUTION INTRAMUSCULAR; INTRAVENOUS at 21:50

## 2019-12-31 RX ADMIN — NICOTINE TRANSDERMAL SYSTEM 21 MG: 21 PATCH, EXTENDED RELEASE TRANSDERMAL at 14:54

## 2019-12-31 RX ADMIN — SODIUM CHLORIDE, POTASSIUM CHLORIDE, SODIUM LACTATE AND CALCIUM CHLORIDE: 600; 310; 30; 20 INJECTION, SOLUTION INTRAVENOUS at 17:20

## 2019-12-31 RX ADMIN — AMPICILLIN SODIUM AND SULBACTAM SODIUM 3 G: 2; 1 INJECTION, POWDER, FOR SOLUTION INTRAMUSCULAR; INTRAVENOUS at 23:01

## 2019-12-31 RX ADMIN — CEFAZOLIN 2 G: 330 INJECTION, POWDER, FOR SOLUTION INTRAMUSCULAR; INTRAVENOUS at 21:03

## 2019-12-31 RX ADMIN — ROCURONIUM BROMIDE 50 MG: 10 INJECTION, SOLUTION INTRAVENOUS at 21:09

## 2019-12-31 RX ADMIN — FENTANYL CITRATE 50 MCG: 50 INJECTION, SOLUTION INTRAMUSCULAR; INTRAVENOUS at 21:50

## 2019-12-31 RX ADMIN — IOHEXOL 80 ML: 350 INJECTION, SOLUTION INTRAVENOUS at 11:54

## 2019-12-31 RX ADMIN — MIDAZOLAM 2 MG: 1 INJECTION INTRAMUSCULAR; INTRAVENOUS at 21:05

## 2019-12-31 RX ADMIN — KETOROLAC TROMETHAMINE 30 MG: 30 INJECTION, SOLUTION INTRAMUSCULAR at 21:20

## 2019-12-31 RX ADMIN — MORPHINE SULFATE 2 MG: 4 INJECTION INTRAVENOUS at 22:55

## 2019-12-31 RX ADMIN — PROPOFOL 150 MG: 10 INJECTION, EMULSION INTRAVENOUS at 21:09

## 2019-12-31 RX ADMIN — SUGAMMADEX 200 MG: 100 INJECTION, SOLUTION INTRAVENOUS at 21:20

## 2019-12-31 RX ADMIN — AMPICILLIN SODIUM AND SULBACTAM SODIUM 3 G: 2; 1 INJECTION, POWDER, FOR SOLUTION INTRAMUSCULAR; INTRAVENOUS at 10:50

## 2019-12-31 RX ADMIN — MORPHINE SULFATE 4 MG: 4 INJECTION INTRAVENOUS at 10:49

## 2019-12-31 RX ADMIN — FENTANYL CITRATE 100 MCG: 50 INJECTION, SOLUTION INTRAMUSCULAR; INTRAVENOUS at 21:05

## 2019-12-31 RX ADMIN — LIDOCAINE HYDROCHLORIDE 50 MG: 20 INJECTION, SOLUTION EPIDURAL; INFILTRATION; INTRACAUDAL at 21:09

## 2019-12-31 RX ADMIN — FENTANYL CITRATE 50 MCG: 50 INJECTION, SOLUTION INTRAMUSCULAR; INTRAVENOUS at 22:01

## 2019-12-31 RX ADMIN — DEXAMETHASONE SODIUM PHOSPHATE 8 MG: 4 INJECTION, SOLUTION INTRA-ARTICULAR; INTRALESIONAL; INTRAMUSCULAR; INTRAVENOUS; SOFT TISSUE at 21:12

## 2019-12-31 ASSESSMENT — LIFESTYLE VARIABLES
EVER_SMOKED: YES
EVER HAD A DRINK FIRST THING IN THE MORNING TO STEADY YOUR NERVES TO GET RID OF A HANGOVER: NO
CONSUMPTION TOTAL: NEGATIVE
AVERAGE NUMBER OF DAYS PER WEEK YOU HAVE A DRINK CONTAINING ALCOHOL: 0
TOTAL SCORE: 0
ON A TYPICAL DAY WHEN YOU DRINK ALCOHOL HOW MANY DRINKS DO YOU HAVE: 0
HOW MANY TIMES IN THE PAST YEAR HAVE YOU HAD 5 OR MORE DRINKS IN A DAY: 0
DOES PATIENT WANT TO STOP DRINKING: NO
ALCOHOL_USE: NO
CONSUMPTION TOTAL: INCOMPLETE
DO YOU DRINK ALCOHOL: NO
TOTAL SCORE: 0
HAVE PEOPLE ANNOYED YOU BY CRITICIZING YOUR DRINKING: NO
EVER FELT BAD OR GUILTY ABOUT YOUR DRINKING: NO
EVER FELT BAD OR GUILTY ABOUT YOUR DRINKING: NO
DOES PATIENT WANT TO STOP DRINKING: NO
HAVE YOU EVER FELT YOU SHOULD CUT DOWN ON YOUR DRINKING: NO
TOTAL SCORE: 0
TOTAL SCORE: 0
EVER HAD A DRINK FIRST THING IN THE MORNING TO STEADY YOUR NERVES TO GET RID OF A HANGOVER: NO
HAVE PEOPLE ANNOYED YOU BY CRITICIZING YOUR DRINKING: NO
HAVE YOU EVER FELT YOU SHOULD CUT DOWN ON YOUR DRINKING: NO
TOTAL SCORE: 0
TOTAL SCORE: 0

## 2019-12-31 ASSESSMENT — ENCOUNTER SYMPTOMS
SHORTNESS OF BREATH: 0
CHILLS: 0
FEVER: 1

## 2019-12-31 ASSESSMENT — PATIENT HEALTH QUESTIONNAIRE - PHQ9
2. FEELING DOWN, DEPRESSED, IRRITABLE, OR HOPELESS: NOT AT ALL
SUM OF ALL RESPONSES TO PHQ9 QUESTIONS 1 AND 2: 0
1. LITTLE INTEREST OR PLEASURE IN DOING THINGS: NOT AT ALL

## 2019-12-31 ASSESSMENT — COGNITIVE AND FUNCTIONAL STATUS - GENERAL
SUGGESTED CMS G CODE MODIFIER DAILY ACTIVITY: CH
MOBILITY SCORE: 24
DAILY ACTIVITIY SCORE: 24
SUGGESTED CMS G CODE MODIFIER MOBILITY: CH

## 2019-12-31 ASSESSMENT — PAIN SCALES - GENERAL: PAIN_LEVEL: 0

## 2019-12-31 NOTE — ED NOTES
Med Rec complete per Pt at bedside  Allergies Reviewed    Pt started a 10 day course of AUGMENTIN on 12/28

## 2019-12-31 NOTE — H&P
Hospital Medicine History & Physical Note    Date of Service  12/31/2019    Primary Care Physician   None    Consultants  Dr. Hernandez, oral surgery    Code Status  Full     Chief Complaint  Right jaw swelling    History of Presenting Illness  19 y.o. male who presented 12/31/2019 with right upper tooth infection. Mr. Lima has no previously known medical conditions that developed pain and swelling of the right upper second back molar.  He presented to emergency room on 12/20/2019 was given dose of IV Unasyn and discharged home on oral Augmentin.  His right facial swelling worsened and now is being pressure on the right eye and he has increased pain therefore presented back to the emergency room where a CAT scan reveals a 2.2 cm subperiosteal abscess along the right maxilla and right facial cellulitis therefore he will be admitted for IV antibiotics and surgical incision and drainage by Dr. Hernandez.   In the emergency room, we had a discussion about smoking cessation and the vasoconstrictive aspects of nicotine.  Mr. Lima is interested in cessation and will accept a nicotine patch.  His girlfriend is at bedside and we also discussed that is very difficult for patients to stop smoking while there significant other smoke she nodded in agreement 3 minutes were spent in discussion of smoking cessation.    Review of Systems  Review of Systems   Constitutional: Positive for fever. Negative for chills.   HENT:        Right facial pain and swelling   Respiratory: Negative for shortness of breath.    Cardiovascular: Negative for chest pain.   All other systems reviewed and are negative.      Past Medical History  No medical problems    Surgical History  none     Family History  Strong family history of dental decay     Social History   reports that he has been smoking. He has been smoking about 0.50 packs per day. He has never used smokeless tobacco. He reports current drug use. Drug: Inhaled. He reports that he does not  drink alcohol.    Allergies  No Known Allergies    Medications  Prior to Admission Medications   Prescriptions Last Dose Informant Patient Reported? Taking?   amoxicillin-clavulanate (AUGMENTIN) 875-125 MG Tab 12/31/2019 at 0800 Patient No No   Sig: Take 1 Tab by mouth 2 times a day for 10 days.   ibuprofen (MOTRIN) 600 MG Tab 12/30/2019 at PM Patient Yes Yes   Sig: Take 600 mg by mouth every 6 hours as needed for Moderate Pain or Inflammation.      Facility-Administered Medications: None       Physical Exam  Temp:  [36.6 °C (97.9 °F)] 36.6 °C (97.9 °F)  Pulse:  [75-77] 77  Resp:  [18-20] 18  BP: (110-112)/(70-77) 112/77  SpO2:  [100 %] 100 %    Physical Exam  Vitals signs and nursing note reviewed.   Constitutional:       Appearance: Normal appearance.   HENT:      Head: Normocephalic and atraumatic.      Mouth/Throat:      Comments: Overall very poor dentition, his right second back molar in the 1 in front of that are black and necrotic  He has significant right facial swelling and redness consistent with facial cellulitis  Eyes:      General: No scleral icterus.     Conjunctiva/sclera: Conjunctivae normal.   Neck:      Musculoskeletal: Normal range of motion and neck supple.   Cardiovascular:      Rate and Rhythm: Normal rate and regular rhythm.      Heart sounds: No murmur.   Pulmonary:      Effort: Pulmonary effort is normal. No respiratory distress.      Breath sounds: No wheezing.   Abdominal:      General: There is no distension.      Tenderness: There is no tenderness.   Musculoskeletal:      Right lower leg: No edema.      Left lower leg: No edema.   Skin:     General: Skin is warm and dry.   Neurological:      General: No focal deficit present.      Mental Status: He is alert and oriented to person, place, and time.   Psychiatric:         Mood and Affect: Mood normal.         Behavior: Behavior normal.         Laboratory:  Recent Labs     12/31/19  1041   WBC 12.5*   RBC 4.54*   HEMOGLOBIN 13.8*    HEMATOCRIT 41.9*   MCV 92.3   MCH 30.4   MCHC 32.9*   RDW 42.5   PLATELETCT 254   MPV 10.1     Recent Labs     12/31/19  1041   SODIUM 141   POTASSIUM 4.1   CHLORIDE 110   CO2 24   GLUCOSE 76   BUN 14   CREATININE 0.92   CALCIUM 9.1     Recent Labs     12/31/19  1041   ALTSGPT 8   ASTSGOT 10*   ALKPHOSPHAT 65   TBILIRUBIN 0.3   GLUCOSE 76         No results for input(s): NTPROBNP in the last 72 hours.      No results for input(s): TROPONINT in the last 72 hours.    Urinalysis:    No results found     Imaging:  CT-MAXILLOFACIAL WITH PLUS RECONS   Final Result      1.  A 2.2 cm subperiosteal abscess along the right maxilla. Poor dentition.   2.  Right facial cellulitis.   3.  Reactive cervical lymphadenopathy.      [id number]   Final Result      No acute mandibular fracture.   Periodontal disease.   Possible abscess right mandibular first molar remnant.            Assessment/Plan:  I anticipate this patient will require at least two midnights for appropriate medical management, necessitating inpatient admission.    * Dental abscess- (present on admission)  Assessment & Plan  This is failed outpatient Augmentin therapy  CAT scan confirms an abscess  He will be kept n.p.o. and Dr. Hernandez, oral surgery, has been consulted from the emergency room will take him to the operating room tonight  He will be treated with IV Unasyn  Oral and IV pain medications have been ordered    Tobacco use- (present on admission)  Assessment & Plan  Cessation has been discussed  Nicotine patch offered      VTE prophylaxis: SCDs

## 2019-12-31 NOTE — ED PROVIDER NOTES
ED Provider Note    Scribed for Paulino Khan M.D. by Nessa Eubanks. 12/31/2019, 10:34 AM.    Primary care provider: None  Means of arrival: Walk in  History obtained from: Patient  History limited by: None    CHIEF COMPLAINT  •  Facial Swelling    HPI  Christian Lima Jr. is a 19 y.o. male who presents to the Emergency Department for facial swelling with an onset of three days. The patient developed an acute onset of oral pain and swelling to his right upper jaw three days ago secondary to poor dentition. He was evaluated in the emergency room on 12/28/19 for this complaint and treated with Unasyn and Decadron. He was discharged with a prescription for Augmentin. Since starting the antibiotic, the swelling has increased and now extends to his right eye, cheek and jaw. He has overlying erythema and pain to these areas and complains of trismus. No alleviating factors were reported. Negative for fevers, shortness of breath, dysphagia or vomiting. The patient is not established with a dentist.      REVIEW OF SYSTEMS  Pertinent positives include facial swelling with erythema, facial pain and trismus. Pertinent negatives include no fevers, shortness of breath, dysphagia or vomiting. As above, all other systems reviewed and are negative. See HPI for further details.       PAST MEDICAL HISTORY  Patient has a past medical history of Sciatica.      SURGICAL HISTORY  Patient denies any recent surgeries.      SOCIAL HISTORY  Social History     Tobacco Use   • Smoking status: Current Every Day Smoker     Packs/day: 0.50   • Smokeless tobacco: Never Used   Substance Use Topics   • Alcohol use: No   • Drug use: Yes     Types: Inhaled     Comment: marijuana 2/week      Social History     Substance and Sexual Activity   Drug Use Yes   • Types: Inhaled    Comment: marijuana 2/week       FAMILY HISTORY  History reviewed. No pertinent family history.      CURRENT MEDICATIONS  Home Medications     Reviewed by Rosa MONTIEL  "Hannah Mack (Pharmacy Tech) on 12/31/19 at 1103  Med List Status: Complete   Medication Last Dose Status   amoxicillin-clavulanate (AUGMENTIN) 875-125 MG Tab 12/31/2019 Active   ibuprofen (MOTRIN) 600 MG Tab 12/30/2019 Active                ALLERGIES  None      PHYSICAL EXAM  VITAL SIGNS: /70   Pulse 75   Temp 36.6 °C (97.9 °F) (Temporal)   Resp 20   Ht 1.702 m (5' 7\")   Wt 69.4 kg (153 lb)   SpO2 100%   BMI 23.96 kg/m²     Vitals reviewed.    Constitutional: Alert in no apparent distress.  HENT: No signs of trauma, Bilateral external ears normal, Nose normal. Dental caries noted, Facial swelling with no globe involvement.   Eyes: Pupils are equal and reactive, Conjunctiva normal, Non-icteric. Soft tissue swelling of right face with no globe involvement.   Neck: Normal range of motion, No tenderness, Supple, No stridor.   Lymphatic: No lymphadenopathy noted.   Cardiovascular: Regular rate and rhythm, no murmurs.   Thorax & Lungs: Normal breath sounds, No respiratory distress, No wheezing, No chest tenderness.   Abdomen: Bowel sounds normal, Soft, No tenderness, No peritoneal signs, No masses, No pulsatile masses.   Skin: Warm, Dry, No erythema, No rash.   Back: No bony tenderness, No CVA tenderness.   Extremities: Intact distal pulses, No edema, No tenderness, No cyanosis  Musculoskeletal: Good range of motion in all major joints. No tenderness to palpation or major deformities noted.   Neurologic: Alert , Normal motor function, Normal sensory function, No focal deficits noted.   Psychiatric: Affect normal, Judgment normal, Mood normal.       DIAGNOSTIC STUDIES / PROCEDURES    LABS  Labs Reviewed   CBC WITH DIFFERENTIAL - Abnormal; Notable for the following components:       Result Value    WBC 12.5 (*)     RBC 4.54 (*)     Hemoglobin 13.8 (*)     Hematocrit 41.9 (*)     MCHC 32.9 (*)     Lymphocytes 18.70 (*)     Neutrophils (Absolute) 8.37 (*)     Monos (Absolute) 1.53 (*)     All other components " "within normal limits   COMP METABOLIC PANEL - Abnormal; Notable for the following components:    AST(SGOT) 10 (*)     All other components within normal limits   LACTIC ACID   BLOOD CULTURE    Narrative:     1 of 2 for Blood Culture x 2 sites order. Per Hospital  Policy: Only change Specimen Src: to \"Line\" if specified by  physician order.   BLOOD CULTURE    Narrative:     2 of 2 blood culture x2  Sites order. Per Hospital Policy:  Only change Specimen Src: to \"Line\" if specified by physician  order.   ESTIMATED GFR      All labs reviewed by me.      RADIOLOGY  CT-MAXILLOFACIAL WITH PLUS RECONS   Final Result      1.  A 2.2 cm subperiosteal abscess along the right maxilla. Poor dentition.   2.  Right facial cellulitis.   3.  Reactive cervical lymphadenopathy.      [id number]   Final Result      No acute mandibular fracture.   Periodontal disease.   Possible abscess right mandibular first molar remnant.        The radiologist's interpretation of all radiological studies have been reviewed by me.      COURSE & MEDICAL DECISION MAKING  Pertinent Labs & Imaging studies reviewed. (See chart for details)    Differential Diagnoses include but are not limited to: abscess versus facial cellulitis.     10:34 AM Obtained and reviewed patient's electronic medical records which indicate an ED visit on 12/28/19 for oral swelling and treated with Unasyn and Decadron for suspected facial cellulitis. Discharged with Augmentin.    10:38 AM Patient seen and examined at bedside for facial swelling.      Initial orders in the Emergency Department included XR mandible, CT maxilla and laboratory testing: CBC with differential, CMP, estimated GFR, lactic acid and blood cultures.  Initial treatment in the Emergency Department included Unasyn 3 g IV and Morphine 4 mg IV. Patient received maintenance fluids as he is to remain NPO.    12:24 PM Paged Hospitalist.    12:28 PM On reevaluation, diagnostic results were reviewed with him. " Plan to speak with Oral Surgery. Patient ate last night and drank fluids at 9:30 AM today. He is to remain NPO.    12:36 PM Spoke with Dr. Hernandez, Oral Surgery, regarding the patient's presenting symptoms and diagnostic results. He will assess the patient for surgery and asked to the patient to be hospitalized.    12:56 PM Spoke with Dr. Roman, Hospitalist, regarding the patient's presenting symptoms and diagnostic results. They will assess the patient for hospitalization.          DISPOSITION  Patient will be hospitalized by Dr. Roman, Hospitalist, in stable condition.      DIAGNOSIS  1. Dental abscess    2. Facial cellulitis             Nessa PEREIRA (Mikayla), am scribing for, and in the presence of, Paulino Khan M.D.    Electronically signed by: Nessa Eubanks (Mikayla), 12/31/2019    Paulino PEREIRA M.D. personally performed the services described in this documentation, as scribed by Nessa Eubanks in my presence, and it is both accurate and complete. C.      The note accurately reflects work and decisions made by me.  Paulino Khan  12/31/2019  1:09 PM

## 2019-12-31 NOTE — ED TRIAGE NOTES
Chief Complaint   Patient presents with   • Facial Swelling     since Saturday     Pt ambulated to triage, here for right facial swelling from tooth right upper quad. Swelling spread to right eye ,cheek and jaw, with trismus. Pt denies sob or difficulty swallowing. He is currently taking Augmentin but no relief.

## 2020-01-01 VITALS
RESPIRATION RATE: 16 BRPM | BODY MASS INDEX: 24.01 KG/M2 | OXYGEN SATURATION: 97 % | HEIGHT: 67 IN | WEIGHT: 153 LBS | HEART RATE: 72 BPM | SYSTOLIC BLOOD PRESSURE: 96 MMHG | DIASTOLIC BLOOD PRESSURE: 55 MMHG | TEMPERATURE: 98.8 F

## 2020-01-01 PROBLEM — K04.7 DENTAL ABSCESS: Status: RESOLVED | Noted: 2019-12-31 | Resolved: 2020-01-01

## 2020-01-01 LAB
GRAM STN SPEC: ABNORMAL
RHODAMINE-AURAMINE STN SPEC: NORMAL
SIGNIFICANT IND 70042: ABNORMAL
SIGNIFICANT IND 70042: NORMAL
SITE SITE: ABNORMAL
SITE SITE: NORMAL
SOURCE SOURCE: ABNORMAL
SOURCE SOURCE: NORMAL

## 2020-01-01 PROCEDURE — 700111 HCHG RX REV CODE 636 W/ 250 OVERRIDE (IP): Performed by: HOSPITALIST

## 2020-01-01 PROCEDURE — 99239 HOSP IP/OBS DSCHRG MGMT >30: CPT | Performed by: INTERNAL MEDICINE

## 2020-01-01 PROCEDURE — 700105 HCHG RX REV CODE 258: Performed by: HOSPITALIST

## 2020-01-01 PROCEDURE — 700102 HCHG RX REV CODE 250 W/ 637 OVERRIDE(OP): Performed by: HOSPITALIST

## 2020-01-01 PROCEDURE — A9270 NON-COVERED ITEM OR SERVICE: HCPCS | Performed by: HOSPITALIST

## 2020-01-01 RX ORDER — ONDANSETRON 4 MG/1
4 TABLET, ORALLY DISINTEGRATING ORAL EVERY 4 HOURS PRN
Qty: 10 TAB | Refills: 0 | Status: SHIPPED | OUTPATIENT
Start: 2020-01-01

## 2020-01-01 RX ORDER — AMOXICILLIN 250 MG
2 CAPSULE ORAL 2 TIMES DAILY
Qty: 30 TAB | Refills: 0 | Status: SHIPPED | OUTPATIENT
Start: 2020-01-01

## 2020-01-01 RX ORDER — NICOTINE 14MG/24HR
1 PATCH, TRANSDERMAL 24 HOURS TRANSDERMAL 2 TIMES DAILY WITH MEALS
Qty: 20 CAP | Refills: 0 | Status: SHIPPED | OUTPATIENT
Start: 2020-01-01 | End: 2020-01-11

## 2020-01-01 RX ORDER — OXYCODONE HYDROCHLORIDE 5 MG/1
5 TABLET ORAL EVERY 6 HOURS PRN
Qty: 12 TAB | Refills: 0 | Status: SHIPPED | OUTPATIENT
Start: 2020-01-01 | End: 2020-01-04

## 2020-01-01 RX ORDER — ACETAMINOPHEN 325 MG/1
650 TABLET ORAL EVERY 6 HOURS PRN
Qty: 30 TAB | Refills: 0 | Status: SHIPPED | OUTPATIENT
Start: 2020-01-01

## 2020-01-01 RX ORDER — AMOXICILLIN AND CLAVULANATE POTASSIUM 875; 125 MG/1; MG/1
1 TABLET, FILM COATED ORAL 2 TIMES DAILY
Qty: 20 TAB | Refills: 0 | Status: SHIPPED | OUTPATIENT
Start: 2020-01-01 | End: 2020-01-11

## 2020-01-01 RX ADMIN — OXYCODONE HYDROCHLORIDE 10 MG: 5 TABLET ORAL at 11:20

## 2020-01-01 RX ADMIN — NICOTINE TRANSDERMAL SYSTEM 21 MG: 21 PATCH, EXTENDED RELEASE TRANSDERMAL at 04:48

## 2020-01-01 RX ADMIN — AMPICILLIN SODIUM AND SULBACTAM SODIUM 3 G: 2; 1 INJECTION, POWDER, FOR SOLUTION INTRAMUSCULAR; INTRAVENOUS at 11:20

## 2020-01-01 RX ADMIN — SODIUM CHLORIDE, POTASSIUM CHLORIDE, SODIUM LACTATE AND CALCIUM CHLORIDE: 600; 310; 30; 20 INJECTION, SOLUTION INTRAVENOUS at 06:47

## 2020-01-01 RX ADMIN — AMPICILLIN SODIUM AND SULBACTAM SODIUM 3 G: 2; 1 INJECTION, POWDER, FOR SOLUTION INTRAMUSCULAR; INTRAVENOUS at 04:47

## 2020-01-01 NOTE — PROGRESS NOTES
Bedside report received.  Assessment complete.  A&O x 4. Patient calls appropriately.  Patient up self and steady.   Patient has minimal pain, declines pain medication at this time.  Denies N&V. Tolerating diet.  Surgical incision to internal mouth to R jaw, teeth extraction, gauze changed as needed to area.  + void, + flatus, 12/31 BM.  Patient denies SOB.  Patient girlfriend at bedside.  Review plan with of care with patient. Call light and personal belongings with in reach. Hourly rounding in place. All needs met at this time.

## 2020-01-01 NOTE — ANESTHESIA PROCEDURE NOTES
Airway  Date/Time: 12/31/2019 9:10 PM  Performed by: Andrés Darden III, M.D.  Authorized by: Andrés Darden III, M.D.     Location:  OR  Urgency:  Elective  Difficult Airway: No    Indications for Airway Management:  Anesthesia  Spontaneous Ventilation: absent    Sedation Level:  Deep  Preoxygenated: Yes    Patient Position:  Sniffing  Final Airway Type:  Endotracheal airway  Final Endotracheal Airway:  ETT  Cuffed: Yes    Technique Used for Successful ETT Placement:  Direct laryngoscopy  Insertion Site:  Oral  Blade Type:  López  Laryngoscope Blade/Videolaryngoscope Blade Size:  3  ETT Size (mm):  7.0  Measured from:  Lips  ETT to Lips (cm):  22  Placement Verified by: auscultation and capnometry    Cormack-Lehane Classification:  Grade III - view of epiglottis only  Number of Attempts at Approach:  1

## 2020-01-01 NOTE — CARE PLAN
Problem: Pain Management  Goal: Pain level will decrease to patient's comfort goal  Outcome: PROGRESSING AS EXPECTED  Note:   Patient experiencing pain relief with MAR medication. Patient encouraged to call if pain worsens. Hourly rounding in place.    Problem: Safety  Goal: Will remain free from injury  Outcome: PROGRESSING AS EXPECTED  Note:   Patient free from accidental injury at this time. Safety precautions in place. Hourly rounding in place

## 2020-01-01 NOTE — DISCHARGE SUMMARY
Discharge Summary    CHIEF COMPLAINT ON ADMISSION  Chief Complaint   Patient presents with   • Facial Swelling     since Saturday       Reason for Admission  Facial Swelling; Dental Pain     Admission Date  12/31/2019    CODE STATUS  Full Code    HPI & HOSPITAL COURSE  This is a 19 y.o. male here with periodontal abscess and cellulitis.  Please see the dictated HPI 12/31/2019 for complete details.  Patient was taken to the operating room by OMFS and underwent incision and drainage of abscess as well as dental extraction of teeth #1, 2, 3, alveoloplasty of the aforementioned teeth 12/31/2019.  Patient received several doses of IV Unasyn during his stay as well as pain medication.  He was cleared from surgery so long as he was tolerating a diet and his pain, nausea and vomiting were controlled.    On postoperative day #1 the patient was tolerating at least 50% of his breakfast and 100% of his fluids.  He was asking for more fluids.  He requested a nicotine patch.  He was counseled extensively on the need to stop using tobacco products as these complicate wound healing and increases risk of infection.  At discharge he will be given a prescription for Augmentin, oral rinse, stool softeners, pain relievers and instructions to follow-up with his oral surgeon Dr. De Hernandez for routine follow-up.      Therefore, he is discharged in good and stable condition to home with close outpatient follow-up.    Discharge Date  1/1/2020    FOLLOW UP ITEMS POST DISCHARGE  Oral and maxillofacial surgery follow-up    DISCHARGE DIAGNOSES  Principal Problem (Resolved):    Dental abscess POA: Yes  Active Problems:    Tobacco use POA: Yes      FOLLOW UP  No future appointments.  No follow-up provider specified.    MEDICATIONS ON DISCHARGE     Medication List      START taking these medications      Instructions   acetaminophen 325 MG Tabs  Commonly known as:  TYLENOL   Take 2 Tabs by mouth every 6 hours as needed (Mild Pain; (Pain scale  1-3); Temp greater than 100.5 F).  Dose:  650 mg     magnesium hydroxide 400 MG/5ML Susp  Commonly known as:  MILK OF MAGNESIA   Take 30 mL by mouth 1 time daily as needed (if polyethylene glycol ineffective after 24 hours).  Dose:  30 mL     ondansetron 4 MG Tbdp  Commonly known as:  ZOFRAN ODT   Take 1 Tab by mouth every four hours as needed for Nausea (give PO if no IV route available).  Dose:  4 mg     oxyCODONE immediate-release 5 MG Tabs  Commonly known as:  ROXICODONE   Take 1 Tab by mouth every 6 hours as needed for up to 3 days.  Dose:  5 mg     Probiotic 250 MG Caps   Take 1 Cap by mouth 2 times a day, with meals for 10 days.  Dose:  1 Cap     senna-docusate 8.6-50 MG Tabs  Commonly known as:  PERICOLACE or SENOKOT S   Doctor's comments:  STOP if diarrhea  Take 2 Tabs by mouth 2 Times a Day.  Dose:  2 Tab        CONTINUE taking these medications      Instructions   amoxicillin-clavulanate 875-125 MG Tabs  Commonly known as:  AUGMENTIN   Take 1 Tab by mouth 2 times a day for 10 days.  Dose:  1 Tab     ibuprofen 600 MG Tabs  Commonly known as:  MOTRIN   Take 600 mg by mouth every 6 hours as needed for Moderate Pain or Inflammation.  Dose:  600 mg            Allergies  No Known Allergies    DIET  Orders Placed This Encounter   Procedures   • Diet Order Regular     Standing Status:   Standing     Number of Occurrences:   1     Order Specific Question:   Diet:     Answer:   Regular [1]     Order Specific Question:   Texture/Fiber modifications:     Answer:   Dysphagia 3(Mechanical Soft)specify fluid consistency(question 6) [3]     Comments:   dental soft       ACTIVITY  As tolerated.  Weight bearing as tolerated    CONSULTATIONS  OMFS    PROCEDURES  I&D and alveoloplasty teeth #1, 2, 3 following extraction of aforementioned teeth; Dr. De Hernandez    LABORATORY  Lab Results   Component Value Date    SODIUM 141 12/31/2019    POTASSIUM 4.1 12/31/2019    CHLORIDE 110 12/31/2019    CO2 24 12/31/2019    GLUCOSE 76  12/31/2019    BUN 14 12/31/2019    CREATININE 0.92 12/31/2019        Lab Results   Component Value Date    WBC 12.5 (H) 12/31/2019    HEMOGLOBIN 13.8 (L) 12/31/2019    HEMATOCRIT 41.9 (L) 12/31/2019    PLATELETCT 254 12/31/2019        Total time of the discharge process exceeds 40 minutes.

## 2020-01-01 NOTE — DISCHARGE INSTRUCTIONS
Discharge Instructions    Discharged to home by car with relative. Discharged via walking, hospital escort: Yes.  Special equipment needed: Not Applicable    Be sure to schedule a follow-up appointment with your primary care doctor or any specialists as instructed.     Discharge Plan:   Diet Plan: Discussed  Activity Level: Discussed  Smoking Cessation Offered: Patient Refused  Confirmed Follow up Appointment: Patient to Call and Schedule Appointment  Confirmed Symptoms Management: Discussed  Medication Reconciliation Updated: No (Comments)  Influenza Vaccine Indication: Patient Refuses    I understand that a diet low in cholesterol, fat, and sodium is recommended for good health. Unless I have been given specific instructions below for another diet, I accept this instruction as my diet prescription.   Other diet: Dental Soft    Special Instructions: None    · Is patient discharged on Warfarin / Coumadin?   No     Dental Extraction, Care After  These instructions give you information about caring for yourself after your procedure. Your doctor may also give you more specific instructions. Call your doctor if you have any problems or questions after your procedure.  HOME CARE  Lifestyle    · Protect the area where your tooth was removed. Do this even if you do not have any pain.  · Do not smoke, do not spit, and do not drink through a straw until your doctor says it is okay.  · Eat soft foods as told by your doctor. Avoid hot drinks and spicy foods until your gum has healed.  Cut (Incision) Care  · Follow instructions from your doctor about:  ¨ How to take care of the cut that was made in your gum.  ¨ When and how to change gauze.  ¨ When and how to take gauze out of your mouth.  ¨ Having your stitches (sutures) removed.  · Do not chew on the gauze.  · If your gum is bleeding a lot, fold a clean piece of gauze, place it on the bleeding gum, and bite on it as told by your doctor.  General Instructions   · Take  medicines only as told by your doctor.  · Do not rinse your mouth until your doctor says it is okay. Once you can rinse your mouth, it is important to rinse very gently.  ¨ You may rinse your mouth with warm salt water once your doctor says it is okay. You can make a salt rinse by mixing one teaspoon of salt in two cups of warm water. Do this as told by your doctor.  · Do not brush or floss near where your tooth was removed until your doctor says it is okay. You may brush your other teeth.    · If directed, apply ice to your cheek on the side of your mouth where your tooth was removed:  ¨ Put ice in a plastic bag.  ¨ Place a towel between your skin and the bag.  ¨ Leave the ice on for 20 minutes, 2-3 times a day.  · Keep all follow-up visits as told by your doctor. This is important.  GET HELP IF:  · Your medicine is not helping your pain.  · You have a fever and you also have any of these:  ¨ A sick feeling in your stomach (nausea).  ¨ Throwing up (vomiting).  ¨ Chills.  · You have a very bad cough.  · You are short of breath.  GET HELP RIGHT AWAY IF:  · You have a lot of bleeding that does not stop.  · You have more puffiness (swelling).  · You have very bad pain.  · You have fluid, blood, or pus coming from the gum where your tooth was removed.  · You have trouble swallowing.  · You cannot open your mouth.     This information is not intended to replace advice given to you by your health care provider. Make sure you discuss any questions you have with your health care provider.     Document Released: 06/07/2011 Document Revised: 05/03/2016 Document Reviewed: 12/14/2015  [employer] Interactive Patient Education ©2016 [employer]      Antibiotic Medicine  Introduction  Antibiotic medicines are used to treat infections caused by bacteria. They work by hurting or killing the germs that are making you sick.  How will my medicine be picked?  There are many kinds of antibiotic medicines. To help your doctor pick one,  tell your doctor if:  · You have any allergies.  · You are pregnant or plan to get pregnant.  · You are breastfeeding.  · You are taking any medicines. These include over-the-counter medicines, prescription medicines, and herbal remedies.  · You have a medical condition or problem.  If you have questions about why your medicine was picked, ask.  For how long should I take my medicine?  Take your medicine for as long as your doctor tells you to. Do not stop taking it when you feel better. If you stop taking it too soon:  · You may start to feel sick again.  · Your infection may get harder to treat.  · New problems may develop.  What if I miss a dose?  Try not to miss any doses of antibiotic medicine. If you miss a dose:  · Take the dose as soon as you can.  · If you are taking 2 doses a day, take the next dose in 5 to 6 hours.  · If you are taking 3 or more doses a day, take the next dose in 2 to 4 hours. Then go back to the normal schedule.  If you cannot take a missed dose, take the next dose on time. Then take the missed dose after you have taken all the doses as told by your doctor, as if you had one more dose left.  Does this medicine affect birth control?  Birth control pills may not work while you are on antibiotic medicines. If you are taking birth control pills, keep taking them as usual. Use a second form of birth control, such as a condom. Keep using the second form of birth control until you are finished with your current 1 month cycle of birth control pills.  Get help if:  · You get worse.  · You do not feel better a few days after starting the medicine.  · You throw up (vomit).  · There are white patches in your mouth.  · You have new joint pain after starting the medicine.  · You have new muscle aches after starting the medicine.  · You had a fever before starting the medicine, and it comes back.  · You have any symptoms of an allergic reaction, such as an itchy rash. If this happens, stop taking the  medicine.  Get help right away if:  · Your pee (urine) turns dark or becomes blood-colored.  · Your skin turns yellow.  · You bruise or bleed easily.  · You have very bad watery poop (diarrhea) and cramps in your belly (abdomen).  · You have a very bad headache.  · You have signs of a very bad allergic reaction, such as:  ¨ Trouble breathing.  ¨ Wheezing.  ¨ Swelling of the lips, tongue, or face.  ¨ Fainting.  ¨ Blisters on the skin or in the mouth.  If you have signs of a very bad allergic reaction, stop taking the antibiotic medicine right away.  This information is not intended to replace advice given to you by your health care provider. Make sure you discuss any questions you have with your health care provider.  Document Released: 09/26/2009 Document Revised: 08/15/2017 Document Reviewed: 05/04/2016  © 2017 [employer]    Depression / Suicide Risk    As you are discharged from this [facility] facility, it is important to learn how to keep safe from harming yourself.    Recognize the warning signs:  · Abrupt changes in personality, positive or negative- including increase in energy   · Giving away possessions  · Change in eating patterns- significant weight changes-  positive or negative  · Change in sleeping patterns- unable to sleep or sleeping all the time   · Unwillingness or inability to communicate  · Depression  · Unusual sadness, discouragement and loneliness  · Talk of wanting to die  · Neglect of personal appearance   · Rebelliousness- reckless behavior  · Withdrawal from people/activities they love  · Confusion- inability to concentrate     If you or a loved one observes any of these behaviors or has concerns about self-harm, here's what you can do:  · Talk about it- your feelings and reasons for harming yourself  · Remove any means that you might use to hurt yourself (examples: pills, rope, extension cords, firearm)  · Get professional help from the community (Mental Health, Substance Abuse,  psychological counseling)  · Do not be alone:Call your Safe Contact- someone whom you trust who will be there for you.  · Call your local CRISIS HOTLINE 024-7918 or 157-779-0112  · Call your local Children's Mobile Crisis Response Team [place] (042) 268-6743 or www.[employer]  · Call the toll free National Suicide Prevention Hotlines   · National Suicide Prevention Lifeline 759-894-LVEA (8131)  · National Hope Line Network 800-SUICIDE (249-3481)

## 2020-01-01 NOTE — ANESTHESIA QCDR
2019 [facility] Clinical Data Registry (for Quality Improvement)     Postoperative nausea/vomiting risk protocol (Adult = 18 yrs and Pediatric 3-17 yrs)- (430 and 463)  General inhalation anesthetic (NOT TIVA) with PONV risk factors: Yes  Provision of anti-emetic therapy with at least 2 different classes of agents: Yes   Patient DID NOT receive anti-emetic therapy and reason is documented in Medical Record:  N/A    Multimodal Pain Management- (AQI59)  Patient undergoing Elective Surgery (i.e. Outpatient, or ASC, or Prescheduled Surgery prior to Hospital Admission): No  Use of Multimodal Pain Management, two or more drugs and/or interventions, NOT including systemic opioids: N/A  Exception: Documented allergy to multiple classes of analgesics: N/A    PACU assessment of acute postoperative pain prior to Anesthesia Care End- Applies to Patients Age = 18- (ABG7)  Initial PACU pain score is which of the following: < 7/10  Patient unable to report pain score: N/A    Post-anesthetic transfer of care checklist/protocol to PACU/ICU- (426 and 427)  Upon conclusion of case, patient transferred to which of the following locations: PACU/Non-ICU  Use of transfer checklist/protocol: Yes  Exclusion: Service Performed in Patient Hospital Room (and thus did not require transfer): N/A    PACU Reintubation- (AQI31)  General anesthesia requiring endotracheal intubation (ETT) along with subsequent extubation in OR or PACU: No  Required reintubation in the PACU: N/A  Extubation was a planned trial documented in the medical record prior to removal of the original airway device: N/A    Unplanned admission to ICU related to anesthesia service up through end of PACU care- (MD51)  Unplanned admission to ICU (not initially anticipated at anesthesia start time): No

## 2020-01-01 NOTE — PROGRESS NOTES
Pt tolerated extractions and I&D well. Excellent prognosis for recovery . When pt is medically stable and after overnight IV Abx, OK to dc from OMFS standpoint.  Pt can f/u with my office in 1 week . Recommend dc with Augmentin, pain meds and peridex.      Thank you,  De Hernandez, DDS  [phone]

## 2020-01-01 NOTE — DISCHARGE PLANNING
Late entry due to computer downtime.    Care Transition Team Assessment      Anticipated Discharge Disposition: Home    Action: RN CM assessed pt at bedside and verified facesheet demographics.  Pt reports he lives in a motel with his girlfriend in [place].  He is independent with ADLs and IADLs and reports using no DME. Pt has Medicaid medical and prescription drug coverage, uses [facility] pharmacy at [facility]. Pt currently has no PCP, list of providers given to pt. Pt anticipates discharging home with no needs when medically cleared.     Barriers to Discharge: Medical clearance pending.    Plan: Await medical clearance for discharge home.     Information Source  Orientation : Oriented x 4  Information Given By: Patient  Who is responsible for making decisions for patient? : Patient    Readmission Evaluation  Is this a readmission?: No    Elopement Risk  Legal Hold: No  Ambulatory or Self Mobile in Wheelchair: Yes  Disoriented: No  Psychiatric Symptoms: None  History of Wandering: No  Elopement this Admit: No  Vocalizing Wanting to Leave: No  Displays Behaviors, Body Language Wanting to Leave: No-Not at Risk for Elopement  Elopement Risk: Not at Risk for Elopement    Interdisciplinary Discharge Planning  Primary Care Physician: None  Lives with - Patient's Self Care Capacity: Alone and Able to Care For Self  Patient or legal guardian wants to designate a caregiver (see row info): No  Support Systems: (Girlfriend Austin)  Housing / Facility: Motel  Able to Return to Previous ADL's: Yes  Prior Services: Home-Independent  Patient Expects to be Discharged to:: Home  Assistance Needed: No  Durable Medical Equipment: Not Applicable    Discharge Preparedness  What is your plan after discharge?: Home with help  What are your discharge supports?: Partner  Prior Functional Level: Ambulatory, Independent with Activities of Daily Living, Independent with Medication Management  Difficulity with ADLs: None  Difficulity with IADLs:  None    Functional Assesment  Prior Functional Level: Ambulatory, Independent with Activities of Daily Living, Independent with Medication Management    Finances  Financial Barriers to Discharge: No  Prescription Coverage: Yes    Vision / Hearing Impairment  Vision Impairment : No  Hearing Impairment : No         Advance Directive  Advance Directive?: None    Domestic Abuse  Have you ever been the victim of abuse or violence?: No  Physical Abuse or Sexual Abuse: No  Verbal Abuse or Emotional Abuse: No  Possible Abuse Reported to:: Not Applicable         Discharge Risks or Barriers  Discharge risks or barriers?: No PCP, Uninsured / underinsured  Patient risk factors: No PCP, Lack of outside supports, Uninsured or underinsured    Anticipated Discharge Information  Anticipated discharge disposition: Home  Discharge Address: [address] [place] [address] [id number]  Discharge Contact Phone Number: [phone]

## 2020-01-01 NOTE — CARE PLAN
Problem: Safety  Goal: Will remain free from injury  Outcome: PROGRESSING AS EXPECTED  Note:   Patient free from accidental injury at this time. Safety precautions in place. Hourly rounding in place

## 2020-01-01 NOTE — CONSULTS
Oral & Facial Surgery   Consultation Note    ID:  Christian Lima .     HPI:  Pt with hx of facial swelling.   WAs seen in ED on the 28th and sent home with Augmentin.  Failed PO augmentin and returedn with increased STS. OMFS consulted.      Chief Complaint:  Right facial swellin and tooth pain    Exam:   Gen:  AAO x 3, NAD  Head:  NCAT  Eyes:  PERRLA, EOMI  Ears:  EAC Clear  Nose:  Nares patent, no d/c, no heme  Mouth:  librado 30 Mm, teeth 1,2,3 with gross decay present.  Tenderness to palpation on teeth as well.  Swelling in area of right maxilla, traveling to the mandible as well.  Crossing midline slightly.  Tissue locally erythematous, induration present  Throat:  OP Clear    Imaging:   Maxillofacial CT:  Abscess located at right amxilla.  Gas present.   Pano:  PARL and gross ecay on teeth 1,2,3     Assessment:  Facial abscess 2/2 odontogenic infection  I&D with ext of necessary teeth requried    Plan:    To OR for I&D with exts  Continue IV Abx after sx.    Thank You,  De Hernandez, DDS  [phone]

## 2020-01-01 NOTE — ANESTHESIA POSTPROCEDURE EVALUATION
Patient: Christian Lima Jr.    Procedure Summary     Date:  12/31/19 Room / Location:  [facility] OR 08 / SURGERY [facility]    Anesthesia Start:  2103 Anesthesia Stop:  2137    Procedure:  INCISION AND DRAINAGE, ABSCESS, SUBMANDIBULAR REGION - Maxillary (Right ) Diagnosis:  (Right maxillary abcess)    Surgeon:  De Hernandez D.D.S. Responsible Provider:  Andrés Darden III, M.D.    Anesthesia Type:  general ASA Status:  3 - Emergent          Final Anesthesia Type: general  Last vitals  BP   Blood Pressure: 119/76    Temp   36.9 °C (98.4 °F)    Pulse   Pulse: 66   Resp   (!) 11    SpO2   100 %      Anesthesia Post Evaluation    Patient location during evaluation: PACU  Patient participation: complete - patient participated  Level of consciousness: awake and alert  Pain score: 0    Airway patency: patent  Anesthetic complications: no  Cardiovascular status: hemodynamically stable  Respiratory status: acceptable  Hydration status: euvolemic    PONV: none           Nurse Pain Score: 0 (NPRS)

## 2020-01-01 NOTE — ANESTHESIA TIME REPORT
Anesthesia Start and Stop Event Times     Date Time Event    12/31/2019 2044 Ready for Procedure     2103 Anesthesia Start     2137 Anesthesia Stop        Responsible Staff  12/31/19    Name Role Begin End    Andrés Darden III, M.D. Anesth 2103 2137        Preop Diagnosis (Free Text):  Pre-op Diagnosis     Right maxillary abcess        Preop Diagnosis (Codes):    Post op Diagnosis  Oral abscess      Premium Reason  B. 1st Call    Comments: emergency

## 2020-01-01 NOTE — CARE PLAN
Problem: Safety  Goal: Will remain free from injury  Outcome: PROGRESSING AS EXPECTED     Problem: Infection  Goal: Will remain free from infection  Outcome: PROGRESSING AS EXPECTED     Problem: Bowel/Gastric:  Goal: Normal bowel function is maintained or improved  Outcome: PROGRESSING AS EXPECTED     Problem: Pain Management  Goal: Pain level will decrease to patient's comfort goal  Outcome: PROGRESSING AS EXPECTED

## 2020-01-01 NOTE — ASSESSMENT & PLAN NOTE
This is failed outpatient Augmentin therapy  CAT scan confirms an abscess  He will be kept n.p.o. and Dr. Hernandez, oral surgery, has been consulted from the emergency room will take him to the operating room tonight  He will be treated with IV Unasyn  Oral and IV pain medications have been ordered

## 2020-01-01 NOTE — ANESTHESIA PREPROCEDURE EVALUATION
Relevant Problems   No relevant active problems       Physical Exam    Airway   Mallampati: III  TM distance: <3 FB  Neck ROM: full       Cardiovascular - normal exam  Rhythm: regular  Rate: normal  (-) murmur     Dental - normal exam         Pulmonary - normal exam  Breath sounds clear to auscultation     Abdominal    Neurological - normal exam         Other findings: Right facial swelling, difficult airway, emergency            Anesthesia Plan    ASA 3- EMERGENT       Plan - general       Airway plan will be ETT  (Difficult airway, acute abscess, emergency)      Induction: intravenous    Postoperative Plan: Postoperative administration of opioids is intended.    Pertinent diagnostic labs and testing reviewed    Informed Consent:    Anesthetic plan and risks discussed with patient.    Use of blood products discussed with: patient whom consented to blood products.

## 2020-01-01 NOTE — PROGRESS NOTES
Report received from ER.  A&O x 4. Patient calls appropriately.  Patient up self and steady.   Patient has 9/10 pain. Medicated per MAR.  Denies N&V. NPO  Facial swelling to R face from jaw to eye  + void  Patient denies SOB.  Patient girlfriend at bedside, she is coughing frequently. She and patient were educated on the importance of protecting self and others from infection. Mask provided to guest.   Review plan with of care with patient. Call light and personal belongings with in reach. Hourly rounding in place. All needs met at this time.

## 2020-01-01 NOTE — OP REPORT
Operative Report  Oral & Facial Surgery  Dental Extractions    Pt Name:  Christian Lima Jr.     Date of Surgery: 12/31/2019     Surgeon:  De Hernandez DDS    Assistant: None    Pre-Op Diagnosis:     Dental Decay on Teeth #s 1,2,3              Oral Abscess    Post Op Diagnosis:       Same    Operation Performed:           Surgical Extraction of teeth # 1,2,3   Alveoloplasty (1-3 teeth) UR quad   Complicated Intraoral I&D    Description of Surgery    The pt was brought to the operating room by the anesthesia team.  A time out was performed and consents were verified.  The pt was then uneventfully induced into general anesthesia.  A endotracheal tube was inserted and secured by the anesthesia team.  An oropharyngeal throat pack was placed.  approximately 5 mL of 1% lidocaine with 1:100K epi was administered to the proposed surgical sites.     A 15 blade was used to make an incision on the maxillary right vestibule in the area of #3.  Blunt dissection was then carried out to the bone and purulence was encountered.  Culture taken.  Finger dissection carried out to break up loculations.  Copious irrigation was placed in the wound until clear return was encountered.      A 15 blade was used to make a sulcular incision along the sites to be extracted.  A full thickness mucoperiosteal flap was elevated.  Teeth #s 1,2,3 were removed with appropriate bone removal and sectioning with forceps and elevators.  Rongeurs and bone files were used to smooth the alveolus of the bone.  Copious irrigation was placed in the surgical sites.  Single interupted 3.0 chromic gut suture in area of #3 was used to close the wond over the crest.      The throat pack was then removed and hemostasis achieved.  The pt was then turned back over to the anesthesia team, was awakened and extubated uneventfully and taken to the PACU in stable condition.      Estimated Blood Loss:  10 mL  Needle and sponge count:  Correct  Specimens Removed:  Teeth  #s 1,2,3    De Hernandez DDS

## 2020-01-03 LAB
BACTERIA WND AEROBE CULT: ABNORMAL
BACTERIA WND AEROBE CULT: ABNORMAL
GRAM STN SPEC: ABNORMAL
SIGNIFICANT IND 70042: ABNORMAL
SITE SITE: ABNORMAL
SOURCE SOURCE: ABNORMAL

## 2020-01-04 LAB
BACTERIA SPEC ANAEROBE CULT: NORMAL
SIGNIFICANT IND 70042: NORMAL
SITE SITE: NORMAL
SOURCE SOURCE: NORMAL

## 2020-01-05 LAB
BACTERIA BLD CULT: NORMAL
BACTERIA BLD CULT: NORMAL
SIGNIFICANT IND 70042: NORMAL
SIGNIFICANT IND 70042: NORMAL
SITE SITE: NORMAL
SITE SITE: NORMAL
SOURCE SOURCE: NORMAL
SOURCE SOURCE: NORMAL

## 2020-01-29 LAB
FUNGUS SPEC CULT: NORMAL
SIGNIFICANT IND 70042: NORMAL
SITE SITE: NORMAL
SOURCE SOURCE: NORMAL

## 2020-02-26 LAB
MYCOBACTERIUM SPEC CULT: NORMAL
RHODAMINE-AURAMINE STN SPEC: NORMAL
SIGNIFICANT IND 70042: NORMAL
SITE SITE: NORMAL
SOURCE SOURCE: NORMAL

## 2020-08-26 NOTE — PROGRESS NOTES
Patient discharged to home with girlfriend. All lines removed. Discharge instructions and prescription reviewed and understanding verbalized. Patient states they will fill prescriptions. Patient states they will return to emergency if discussed symptoms arise. Patient left walking with girlfriend. Adrianaze provided to patient. Medications from drawer removed and sent back to pharmacy or disposed of per protocol. Chart given to unit clerk.    Patent

## 2024-07-27 ENCOUNTER — HOSPITAL ENCOUNTER (EMERGENCY)
Facility: MEDICAL CENTER | Age: 24
End: 2024-07-28
Attending: STUDENT IN AN ORGANIZED HEALTH CARE EDUCATION/TRAINING PROGRAM
Payer: COMMERCIAL

## 2024-07-27 DIAGNOSIS — G89.18 POSTOPERATIVE PAIN: ICD-10-CM
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
## 2024-07-27 PROCEDURE — 99284 EMERGENCY DEPT VISIT MOD MDM: CPT

## 2024-07-27 ASSESSMENT — PAIN DESCRIPTION - PAIN TYPE: TYPE: ACUTE PAIN

## 2024-07-28 ENCOUNTER — APPOINTMENT (OUTPATIENT)
Dept: RADIOLOGY | Facility: MEDICAL CENTER | Age: 24
End: 2024-07-28
Attending: STUDENT IN AN ORGANIZED HEALTH CARE EDUCATION/TRAINING PROGRAM
Payer: COMMERCIAL

## 2024-07-28 VITALS
HEIGHT: 69 IN | WEIGHT: 140 LBS | TEMPERATURE: 97.7 F | OXYGEN SATURATION: 95 % | BODY MASS INDEX: 20.73 KG/M2 | DIASTOLIC BLOOD PRESSURE: 78 MMHG | RESPIRATION RATE: 20 BRPM | HEART RATE: 84 BPM | SYSTOLIC BLOOD PRESSURE: 107 MMHG

## 2024-07-28 PROCEDURE — A9270 NON-COVERED ITEM OR SERVICE: HCPCS | Mod: UD | Performed by: STUDENT IN AN ORGANIZED HEALTH CARE EDUCATION/TRAINING PROGRAM

## 2024-07-28 PROCEDURE — 700102 HCHG RX REV CODE 250 W/ 637 OVERRIDE(OP): Mod: UD | Performed by: STUDENT IN AN ORGANIZED HEALTH CARE EDUCATION/TRAINING PROGRAM

## 2024-07-28 PROCEDURE — 29515 APPLICATION SHORT LEG SPLINT: CPT

## 2024-07-28 PROCEDURE — 73600 X-RAY EXAM OF ANKLE: CPT | Mod: RT

## 2024-07-28 PROCEDURE — 302874 HCHG BANDAGE ACE 2 OR 3""

## 2024-07-28 PROCEDURE — 302875 HCHG BANDAGE ACE 4 OR 6""

## 2024-07-28 RX ORDER — HYDROCODONE BITARTRATE AND ACETAMINOPHEN 5; 325 MG/1; MG/1
1 TABLET ORAL ONCE
Status: COMPLETED | OUTPATIENT
Start: 2024-07-28 | End: 2024-07-28

## 2024-07-28 RX ADMIN — HYDROCODONE BITARTRATE AND ACETAMINOPHEN 1 TABLET: 5; 325 TABLET ORAL at 01:00

## 2024-07-28 ASSESSMENT — PAIN DESCRIPTION - PAIN TYPE: TYPE: ACUTE PAIN
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
## 2024-07-28 NOTE — ED NOTES
Discharge instructions given and discussed, signed copy in chart. Pt verbalized understanding and all questions answered. Pt discharged in stable condition on room air. Personal belongings given to patient.   
ERP at bedside  
Splint applied per ERP, Pt tolerated well, all questions answered   
This RN booked a pt's ride via [facility]. Pt transported to lobby via   
Statement Selected

## 2025-03-19 ENCOUNTER — HOSPITAL ENCOUNTER (EMERGENCY)
Facility: MEDICAL CENTER | Age: 25
End: 2025-03-19
Attending: EMERGENCY MEDICINE
Payer: MEDICAID

## 2025-03-19 VITALS
HEART RATE: 79 BPM | HEIGHT: 66 IN | WEIGHT: 156 LBS | BODY MASS INDEX: 25.07 KG/M2 | DIASTOLIC BLOOD PRESSURE: 71 MMHG | SYSTOLIC BLOOD PRESSURE: 116 MMHG | RESPIRATION RATE: 16 BRPM | TEMPERATURE: 98.8 F | OXYGEN SATURATION: 92 %

## 2025-03-19 DIAGNOSIS — Z00.8 MEDICAL CLEARANCE FOR INCARCERATION: ICD-10-CM

## 2025-03-19 PROCEDURE — 99283 EMERGENCY DEPT VISIT LOW MDM: CPT

## 2025-03-19 ASSESSMENT — COGNITIVE AND FUNCTIONAL STATUS - GENERAL
SUGGESTED CMS G CODE MODIFIER MOBILITY: CH
SUGGESTED CMS G CODE MODIFIER DAILY ACTIVITY: CH
DAILY ACTIVITIY SCORE: 24
MOBILITY SCORE: 24
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
## 2025-03-19 NOTE — DISCHARGE INSTRUCTIONS
The patient allegedly ingested fentanyl and a unsealed bag roughly 1 hour prior to arrival.  He was observed in the ER for 2 additional hours, he has not had any toxidrome of opioid ingestion, medically cleared for incarceration.

## 2025-03-19 NOTE — ED TRIAGE NOTES
Chief Complaint   Patient presents with    Drug Ingestion     BIB EMS and PD for drug ingestion. Pt was being pulled over and swallowed 1g of fentanyl wrapped in plastic approx 1 hour ago.       FSBG 118. Pt in PD custody

## 2025-03-19 NOTE — ED NOTES
Patient provided discharge instructions, verbalizes understanding and denies any further questions. Patient instructed to return if condition worsens. No distress noted. Pt discharged to PD custody in stable condition with all belongings.

## (undated) DEVICE — HEAD HOLDER JUNIOR/ADULT

## (undated) DEVICE — NEEDLE NON SAFETY HYPO 22 GA X 1 1/2 IN (100/BX)

## (undated) DEVICE — BANDAGE ROLL STERILE BULKEE 4.5 IN X 4 YD (100EA/CA)

## (undated) DEVICE — CHLORAPREP 26 ML APPLICATOR - ORANGE TINT(25/CA)

## (undated) DEVICE — GOWN SURGICAL X-LARGE ULTRA - FILM-REINFORCED (20/CA)

## (undated) DEVICE — SWAB ANAEROBIC SPEC.COLLECTOR - (25/PK 4PK/CA 100EA/CA)

## (undated) DEVICE — KIT ANESTHESIA W/CIRCUIT & 3/LT BAG W/FILTER (20EA/CA)

## (undated) DEVICE — ELECTRODE 850 FOAM ADHESIVE - HYDROGEL RADIOTRNSPRNT (50/PK)

## (undated) DEVICE — GLOVE BIOGEL INDICATOR SZ 7.5 SURGICAL PF LTX - (50PR/BX 4BX/CA)

## (undated) DEVICE — TOWELS CLOTH SURGICAL - (4/PK 20PK/CA)

## (undated) DEVICE — DETERGENT RENUZYME PLUS 10 OZ PACKET (50/BX)

## (undated) DEVICE — JAW BRA #93

## (undated) DEVICE — CANISTER SUCTION 3000ML MECHANICAL FILTER AUTO SHUTOFF MEDI-VAC NONSTERILE LF DISP  (40EA/CA)

## (undated) DEVICE — GLOVE BIOGEL SZ 7.5 SURGICAL PF LTX - (50PR/BX 4BX/CA)

## (undated) DEVICE — NEEDLE SAFETY 18 GA X 1 1/2 IN (100EA/BX)

## (undated) DEVICE — PACK MINOR BASIN - (2EA/CA)

## (undated) DEVICE — SYRINGE DISP. 60 CC LL - (30/BX, 12BX/CA)**WHEN THESE ARE GONE ORDER #500206**

## (undated) DEVICE — BLADE SURGICAL #11 - (50/BX)

## (undated) DEVICE — DRAPE LARGE 3 QUARTER - (20/CA)

## (undated) DEVICE — SUCTION INSTRUMENT YANKAUER BULBOUS TIP W/O VENT (50EA/CA)

## (undated) DEVICE — SUTURE 4-0 PROLENE PS-2 18 (36PK/BX)"

## (undated) DEVICE — SYRINGE 10 ML CONTROL LL (25EA/BX 4BX/CA)

## (undated) DEVICE — SET LEADWIRE 5 LEAD BEDSIDE DISPOSABLE ECG (1SET OF 5/EA)

## (undated) DEVICE — PROTECTOR ULNA NERVE - (36PR/CA)

## (undated) DEVICE — SET EXTENSION WITH 2 PORTS (48EA/CA) ***PART #2C8610 IS A SUBSTITUTE*****

## (undated) DEVICE — SENSOR SPO2 NEO LNCS ADHESIVE (20/BX) SEE USER NOTES

## (undated) DEVICE — SYRINGE SAFETY 10 ML 18 GA X 1 1/2 BLUNT LL (100/BX 4BX/CA)

## (undated) DEVICE — STOPCOCK 3-WAY W/SWIVEL LEVER LOCK (50EA/CA)

## (undated) DEVICE — SUTURE GENERAL

## (undated) DEVICE — DECANTER FLD BLS - (50/CA)

## (undated) DEVICE — ELECTRODE DUAL RETURN W/ CORD - (50/PK)

## (undated) DEVICE — BOVIE BLADE COATED - (50/PK)

## (undated) DEVICE — MASK ANESTHESIA ADULT  - (100/CA)

## (undated) DEVICE — GOWN WARMING STANDARD FLEX - (30/CA)

## (undated) DEVICE — TUBING CLEARLINK DUO-VENT - C-FLO (48EA/CA)

## (undated) DEVICE — NEPTUNE 4 PORT MANIFOLD - (20/PK)

## (undated) DEVICE — CORDS BIPOLAR COAGULATION - 12FT STERILE DISP. (10EA/BX)

## (undated) DEVICE — IV TUBING HI-FLO RATE W/CLAMP (50/CA)

## (undated) DEVICE — DRAPE STRLE REG TOWEL 18X24 - (10/BX 4BX/CA)"

## (undated) DEVICE — LACTATED RINGERS INJ 1000 ML - (14EA/CA 60CA/PF)

## (undated) DEVICE — DRAPE SURGICAL U 77X120 - (10/CA)

## (undated) DEVICE — KIT ROOM DECONTAMINATION